# Patient Record
Sex: MALE | Race: BLACK OR AFRICAN AMERICAN | Employment: PART TIME | URBAN - METROPOLITAN AREA
[De-identification: names, ages, dates, MRNs, and addresses within clinical notes are randomized per-mention and may not be internally consistent; named-entity substitution may affect disease eponyms.]

---

## 2017-08-21 ENCOUNTER — ED HISTORICAL/CONVERTED ENCOUNTER (OUTPATIENT)
Dept: OTHER | Age: 37
End: 2017-08-21

## 2018-06-16 ENCOUNTER — ED HISTORICAL/CONVERTED ENCOUNTER (OUTPATIENT)
Dept: OTHER | Age: 38
End: 2018-06-16

## 2019-08-06 ENCOUNTER — ED HISTORICAL/CONVERTED ENCOUNTER (OUTPATIENT)
Dept: OTHER | Age: 39
End: 2019-08-06

## 2020-12-06 ENCOUNTER — HOSPITAL ENCOUNTER (INPATIENT)
Age: 40
LOS: 6 days | Discharge: HOME | DRG: 751 | End: 2020-12-12
Attending: EMERGENCY MEDICINE | Admitting: PSYCHIATRY & NEUROLOGY
Payer: MEDICAID

## 2020-12-06 DIAGNOSIS — F32.A DEPRESSION, UNSPECIFIED DEPRESSION TYPE: Primary | ICD-10-CM

## 2020-12-06 DIAGNOSIS — R45.851 SUICIDAL IDEATION: ICD-10-CM

## 2020-12-06 PROBLEM — F32.9 MDD (MAJOR DEPRESSIVE DISORDER): Status: ACTIVE | Noted: 2020-12-06

## 2020-12-06 LAB
AMPHET UR QL SCN: NEGATIVE
ANION GAP SERPL CALC-SCNC: 5 MMOL/L (ref 5–15)
BARBITURATES UR QL SCN: NEGATIVE
BASOPHILS # BLD: 0 K/UL (ref 0–0.1)
BASOPHILS NFR BLD: 0 % (ref 0–1)
BENZODIAZ UR QL: NEGATIVE
BUN SERPL-MCNC: 13 MG/DL (ref 6–20)
BUN/CREAT SERPL: 11 (ref 12–20)
CA-I BLD-MCNC: 9.5 MG/DL (ref 8.5–10.1)
CANNABINOIDS UR QL SCN: NEGATIVE
CHLORIDE SERPL-SCNC: 100 MMOL/L (ref 97–108)
CO2 SERPL-SCNC: 32 MMOL/L (ref 21–32)
COCAINE UR QL SCN: POSITIVE
CREAT SERPL-MCNC: 1.16 MG/DL (ref 0.7–1.3)
DIFFERENTIAL METHOD BLD: ABNORMAL
DRUG SCRN COMMENT,DRGCM: ABNORMAL
EOSINOPHIL # BLD: 0.1 K/UL (ref 0–0.4)
EOSINOPHIL NFR BLD: 1 % (ref 0–7)
ERYTHROCYTE [DISTWIDTH] IN BLOOD BY AUTOMATED COUNT: 13.4 % (ref 11.5–14.5)
ETHANOL SERPL-MCNC: <4 MG/DL
GLUCOSE SERPL-MCNC: 99 MG/DL (ref 65–100)
HCT VFR BLD AUTO: 45.8 % (ref 36.6–50.3)
HGB BLD-MCNC: 15.9 G/DL (ref 12.1–17)
IMM GRANULOCYTES # BLD AUTO: 0 K/UL (ref 0–0.04)
IMM GRANULOCYTES NFR BLD AUTO: 0 % (ref 0–0.5)
LYMPHOCYTES # BLD: 2.8 K/UL (ref 0.8–3.5)
LYMPHOCYTES NFR BLD: 20 % (ref 12–49)
MCH RBC QN AUTO: 29.5 PG (ref 26–34)
MCHC RBC AUTO-ENTMCNC: 34.7 G/DL (ref 30–36.5)
MCV RBC AUTO: 85 FL (ref 80–99)
METHADONE UR QL: NEGATIVE
MONOCYTES # BLD: 1.1 K/UL (ref 0–1)
MONOCYTES NFR BLD: 8 % (ref 5–13)
NEUTS SEG # BLD: 9.7 K/UL (ref 1.8–8)
NEUTS SEG NFR BLD: 71 % (ref 32–75)
OPIATES UR QL: NEGATIVE
PCP UR QL: NEGATIVE
PLATELET # BLD AUTO: 319 K/UL (ref 150–400)
PMV BLD AUTO: 10 FL (ref 8.9–12.9)
POTASSIUM SERPL-SCNC: 3.2 MMOL/L (ref 3.5–5.1)
RBC # BLD AUTO: 5.39 M/UL (ref 4.1–5.7)
SARS-COV-2, COV2: NORMAL
SARS-COV-2, COV2: NOT DETECTED
SODIUM SERPL-SCNC: 137 MMOL/L (ref 136–145)
SPECIMEN SOURCE: NORMAL
WBC # BLD AUTO: 13.7 K/UL (ref 4.1–11.1)

## 2020-12-06 PROCEDURE — 65220000003 HC RM SEMIPRIVATE PSYCH

## 2020-12-06 PROCEDURE — 65270000029 HC RM PRIVATE

## 2020-12-06 PROCEDURE — 87635 SARS-COV-2 COVID-19 AMP PRB: CPT

## 2020-12-06 PROCEDURE — 85025 COMPLETE CBC W/AUTO DIFF WBC: CPT

## 2020-12-06 PROCEDURE — 36415 COLL VENOUS BLD VENIPUNCTURE: CPT

## 2020-12-06 PROCEDURE — 99283 EMERGENCY DEPT VISIT LOW MDM: CPT

## 2020-12-06 PROCEDURE — 80307 DRUG TEST PRSMV CHEM ANLYZR: CPT

## 2020-12-06 PROCEDURE — 80048 BASIC METABOLIC PNL TOTAL CA: CPT

## 2020-12-06 PROCEDURE — 74011250637 HC RX REV CODE- 250/637: Performed by: PSYCHIATRY & NEUROLOGY

## 2020-12-06 RX ORDER — LANOLIN ALCOHOL/MO/W.PET/CERES
3 CREAM (GRAM) TOPICAL
Status: DISCONTINUED | OUTPATIENT
Start: 2020-12-06 | End: 2020-12-12 | Stop reason: HOSPADM

## 2020-12-06 RX ORDER — CHLORDIAZEPOXIDE HYDROCHLORIDE 10 MG/1
10 CAPSULE, GELATIN COATED ORAL
Status: DISCONTINUED | OUTPATIENT
Start: 2020-12-06 | End: 2020-12-12 | Stop reason: HOSPADM

## 2020-12-06 RX ORDER — BUPROPION HYDROCHLORIDE 100 MG/1
100 TABLET, EXTENDED RELEASE ORAL DAILY
Status: DISCONTINUED | OUTPATIENT
Start: 2020-12-06 | End: 2020-12-09

## 2020-12-06 RX ADMIN — MELATONIN TAB 3 MG 3 MG: 3 TAB at 22:21

## 2020-12-06 NOTE — BSMART NOTE
BH Intake      Pt assessed face to face in ED #25. Pt presents to ED by private vehicle for SI. Pt presents as polite, cooperative. Pt was A&Ox4. Pt's speech was unremarkable, w/ an appropriate affect. Pt's thought process was logical, focused. Pt endorses SI, denies HI. Pt presents w/ fair insight and judgment. Pt reports he's not doing so good. Pt reported his ex girlfriend passed away last week and another girlfriend had passed away a few months ago. Pt reports \"struggling w/ life in general\". And wants to get his \"mind back right\". Pt endorses daily SI w/ a plan to hang himself saying \"theres plenty of trees around\". Pt denies any hx of past suicidal attempts. Pt rates his depression 10/10. Pt denies HI. Pt reports loss of interest in things and has been withdrawing from others. Pt denies AVH. Pt said his sleep is \"so so\" but only gets a few hours a night. Pt reports losing about 30 pounds in the last month. Pt reports using cocaine daily, about $400 worth yesterday. Pt endorses daily alcohol use, at least a 6 pack, last use yesterday. Pt reports racing thoughts \"all the time\" and stated his anxiety is \"through the roof\". Pt reports past hx of depression and alcohol abuse. Pt reports last IP [address] admission was a couple years ago. Pt denies current OP [address] care. Pt reports his sister is his support. Pt reports he has housing to return to. Pt currently works as a  at his [employer]. Pt is vol for tx. Writer consulted w/  who recommends IP [address] admission. Pt to be accepted to  pending medical clearance/ bed availability. ED notified. Covid test needed.

## 2020-12-06 NOTE — ED NOTES
Assumed care of pt at this time from [facility]. All essential information handed off. Pt stable at this time. Laying on stretcher with eyes open. Resp even and unlabored. Skin warm and dry. A&O x3. Admission to behavioral health pending covid. Pt room cleaned, provided oral hydration, offered oral hygiene. Pt declined. Will continue to monitor.

## 2020-12-06 NOTE — H&P
INITIAL PSYCHIATRIC EVALUATION            IDENTIFICATION:    Patient Name  Leslye Tao   Date of Birth 1980   [facility] [id number]   Medical Record Number  [id number]      Age  41 y.o. PCP None   Admit date:  12/6/2020    Room Number  ER25/25  @ [facility]   Date of Service  12/6/2020            HISTORY         REASON FOR HOSPITALIZATION:  CC: Depression, suicidal ideation, recent loss of his girlfriend, substance abuse   HISTORY OF PRESENT ILLNESS:    The patient, Leslye Tao, is a 41 y.o. BLACK OR  male seen this morning in the emergency department for psychiatric assessment. Patient presented feeling increasingly depressed suicidal with a plan to hang himself stating \"there are plenty of trees around\". Patient reports he has not been feeling well and recently lost his ex-girlfriend who passed away a week ago and another friend passed a few months ago. He expresses he has been struggling with life in general.  He has been increasingly isolating withdrawing from others. He expresses poor sleep poor appetite. He reports losing about 30 pounds in the last month. He is also been using cocaine around $400 on and off. He also endorses regular use of alcohol. Denies any command hallucinations denies any paranoia or persecutory delusions    Past psychiatric history-no recent psychiatric treatment noted or reported denies any past history of suicidal attempts. Trauma abuse history-did not share    Review of system no nausea vomiting no chest pain or shortness of breath voiced or reported    Mental status examination patient is alert oriented x3 presents dysphoric limited eye contact, cooperative. Patient reports feeling depressed, hopeless helpless with suicidal ideations with the plan as stated above. Patient denies any active command hallucinations.   No evidence of any active psychosis noted on examination Insight judgment coping remains poor   ALLERGIES: No Known Allergies   MEDICATIONS PRIOR TO ADMISSION:   (Not in a hospital admission)     PAST MEDICAL HISTORY:   No past medical history on file. No past surgical history on file. SOCIAL HISTORY:    Social History     Socioeconomic History    Marital status: SINGLE     Spouse name: Not on file    Number of children: Not on file    Years of education: Not on file    Highest education level: Not on file   Occupational History    Not on file   Social Needs    Financial resource strain: Not on file    Food insecurity     Worry: Not on file     Inability: Not on file    Transportation needs     Medical: Not on file     Non-medical: Not on file   Tobacco Use    Smoking status: Not on file   Substance and Sexual Activity    Alcohol use: Not on file    Drug use: Not on file    Sexual activity: Not on file   Lifestyle    Physical activity     Days per week: Not on file     Minutes per session: Not on file    Stress: Not on file   Relationships    Social connections     Talks on phone: Not on file     Gets together: Not on file     Attends Congregational service: Not on file     Active member of club or organization: Not on file     Attends meetings of clubs or organizations: Not on file     Relationship status: Not on file    Intimate partner violence     Fear of current or ex partner: Not on file     Emotionally abused: Not on file     Physically abused: Not on file     Forced sexual activity: Not on file   Other Topics Concern    Not on file   Social History Narrative    Not on file      FAMILY HISTORY: History reviewed. No pertinent family history. No family history on file.     REVIEW OF SYSTEMS:   Negative        VITALS:     Patient Vitals for the past 24 hrs:   Temp Pulse Resp BP SpO2   12/06/20 0745 97.4 °F (36.3 °C) 78 16 (!) 142/93 100 %     Wt Readings from Last 3 Encounters:   12/06/20 68 kg (150 lb)     Temp Readings from Last 3 Encounters:   12/06/20 97.4 °F (36.3 °C)     BP Readings from Last 3 Encounters:   12/06/20 (!) 142/93     Pulse Readings from Last 3 Encounters:   12/06/20 78            DATA     LABORATORY DATA:  Labs Reviewed   METABOLIC PANEL, BASIC - Abnormal; Notable for the following components:       Result Value    Potassium 3.2 (*)     BUN/Creatinine ratio 11 (*)     All other components within normal limits   CBC WITH AUTOMATED DIFF - Abnormal; Notable for the following components:    WBC 13.7 (*)     ABS. NEUTROPHILS 9.7 (*)     ABS. MONOCYTES 1.1 (*)     All other components within normal limits   SARS-COV-2, PCR   ETHYL ALCOHOL   SARS-COV-2   DRUG SCREEN, URINE     Admission on 12/06/2020   Component Date Value Ref Range Status    Sodium 12/06/2020 137  136 - 145 mmol/L Final    Potassium 12/06/2020 3.2* 3.5 - 5.1 mmol/L Final    Chloride 12/06/2020 100  97 - 108 mmol/L Final    CO2 12/06/2020 32  21 - 32 mmol/L Final    Anion gap 12/06/2020 5  5 - 15 mmol/L Final    Glucose 12/06/2020 99  65 - 100 mg/dL Final    BUN 12/06/2020 13  6 - 20 mg/dL Final    Creatinine 12/06/2020 1.16  0.70 - 1.30 mg/dL Final    BUN/Creatinine ratio 12/06/2020 11* 12 - 20   Final    GFR est AA 12/06/2020 >60  >60 ml/min/1.73m2 Final    GFR est non-AA 12/06/2020 >60  >60 ml/min/1.73m2 Final    Calcium 12/06/2020 9.5  8.5 - 10.1 mg/dL Final    ALCOHOL(ETHYL),SERUM 12/06/2020 <4  <10 mg/dL Final    WBC 12/06/2020 13.7* 4.1 - 11.1 K/uL Final    RBC 12/06/2020 5.39  4. 10 - 5.70 M/uL Final    HGB 12/06/2020 15.9  12.1 - 17.0 g/dL Final    HCT 12/06/2020 45.8  36.6 - 50.3 % Final    MCV 12/06/2020 85.0  80.0 - 99.0 FL Final    MCH 12/06/2020 29.5  26.0 - 34.0 PG Final    MCHC 12/06/2020 34.7  30.0 - 36.5 g/dL Final    RDW 12/06/2020 13.4  11.5 - 14.5 % Final    PLATELET 67/90/8113 349  150 - 400 K/uL Final    MPV 12/06/2020 10.0  8.9 - 12.9 FL Final    NEUTROPHILS 12/06/2020 71  32 - 75 % Final    LYMPHOCYTES 12/06/2020 20  12 - 49 % Final    MONOCYTES 12/06/2020 8  5 - 13 % Final    EOSINOPHILS 12/06/2020 1  0 - 7 % Final    BASOPHILS 12/06/2020 0  0 - 1 % Final    IMMATURE GRANULOCYTES 12/06/2020 0  0.0 - 0.5 % Final    ABS. NEUTROPHILS 12/06/2020 9.7* 1.8 - 8.0 K/UL Final    ABS. LYMPHOCYTES 12/06/2020 2.8  0.8 - 3.5 K/UL Final    ABS. MONOCYTES 12/06/2020 1.1* 0.0 - 1.0 K/UL Final    ABS. EOSINOPHILS 12/06/2020 0.1  0.0 - 0.4 K/UL Final    ABS. BASOPHILS 12/06/2020 0.0  0.0 - 0.1 K/UL Final    ABS. IMM. GRANS. 12/06/2020 0.0  0.00 - 0.04 K/UL Final    DF 12/06/2020 AUTOMATED    Final    SARS-CoV-2 12/06/2020 Please find results under separate order    Final        RADIOLOGY REPORTS:  No results found for this or any previous visit. No results found. MEDICATIONS       ALL MEDICATIONS  No current facility-administered medications for this encounter. No current outpatient medications on file. SCHEDULED MEDICATIONS  No current facility-administered medications for this encounter. ASSESSMENT & PLAN        The patient, Claudean Shire, is a 41 y.o.  male who presents at this time for treatment of the following diagnoses:  Diagnosis-  Major depressive disorder, moderate recurrent  Cocaine abuse  Alcohol abuse    Admit to behavioral health floor once culture results are available and medically stable    Placed on close observation, for safety  Patient to engage in individual therapy, group therapy support group, psychoeducational group, safety planning. Patient continue to address stressors that led to his hospitalization. Length of stay is 5 to 7 days. Strengths-patient able to express self, able to communicate, average intelligence  Weakness-poor coping, comorbid substance abuse, limited primary support. Discharge Criteria  Patient is no longer actively suicidal or homicidal and has no command hallucinations.   Patient is able to address current symptoms of depression, suicidal ideation and is able to present with healthy ways to cope with current stressors. Current Facility-Administered Medications:     melatonin tablet 3 mg, 3 mg, Oral, QHS, Bijal Park MD    buPROPion SR [facility] SR) tablet 100 mg, 100 mg, Oral, DAILY, Bijal Park MD    chlordiazePOXIDE (LIBRIUM) capsule 10 mg, 10 mg, Oral, TID PRN, Epifanio Escobar MD  No current outpatient medications on file. The risks and benefits of the proposed medication. The patient was given the opportunity to ask questions. Informed consent given to the use of the above medications. Will continue to adjust psychiatric and non-psychiatric medications    I have reviewed admission labs and medical tests in the EHR    have reviewed old psychiatric and medical records available in the EHR    Gather additional collateral information from to further elucidate the nature of patient's psychopathology and baselline level of psychiatric functioning.                  SIGNED:    Trev Flores MD  12/6/2020

## 2020-12-06 NOTE — ED PROVIDER NOTES
EMERGENCY DEPARTMENT HISTORY AND PHYSICAL EXAM      Date: 12/6/2020  Patient Name: Portia Frazier    History of Presenting Illness     Chief Complaint   Patient presents with   [address]       History Provided By: Patient    HPI: Portia Frazier, 41 y.o. male presents to the ED with cc of   Chief Complaint   Patient presents with   [address] Problem   Patient with history of depression not taking any medication for last 2 years presenting with increased depressive symptoms and suicidal ideation no specific plan at this time also admits of drinking smoking and occasionally using drugs, denies any fever denies any nausea vomiting diarrhea or any exposure to COVID-19      There are no other complaints, changes, or physical findings at this time. PCP: None    No current facility-administered medications on file prior to encounter. No current outpatient medications on file prior to encounter. Past History     Past Medical History:  No past medical history on file. Past Surgical History:  No past surgical history on file. Family History:  No family history on file. Social History:  Social History     Tobacco Use    Smoking status: Not on file   Substance Use Topics    Alcohol use: Not on file    Drug use: Not on file       Allergies:  No Known Allergies      Review of Systems   Review of Systems   Constitutional: Negative. HENT: Negative for congestion, facial swelling, rhinorrhea and sore throat. Eyes: Negative. Negative for photophobia and pain. Respiratory: Negative for cough, shortness of breath and wheezing. Cardiovascular: Negative. Negative for chest pain. Gastrointestinal: Negative for abdominal distention and abdominal pain. Genitourinary: Negative. Musculoskeletal: Negative. Allergic/Immunologic: Negative for immunocompromised state. Neurological: Negative. Negative for syncope and weakness. Hematological: Negative.     Psychiatric/Behavioral: Positive for suicidal ideas. Negative for agitation, behavioral problems and confusion. The patient is nervous/anxious. Physical Exam   Physical Exam  Vitals signs and nursing note reviewed. Constitutional:       Appearance: Normal appearance. HENT:      Head: Normocephalic and atraumatic. Mouth/Throat:      Pharynx: Oropharynx is clear. Eyes:      Extraocular Movements: Extraocular movements intact. Pupils: Pupils are equal, round, and reactive to light. Neck:      Musculoskeletal: Normal range of motion and neck supple. Cardiovascular:      Rate and Rhythm: Normal rate and regular rhythm. Pulses: Normal pulses. Heart sounds: Normal heart sounds. Pulmonary:      Breath sounds: Normal breath sounds. Chest:      Breasts:         Right: Normal.         Left: No tenderness. Abdominal:      General: Abdomen is flat. Bowel sounds are normal.      Palpations: Abdomen is soft. Musculoskeletal: Normal range of motion. Skin:     General: Skin is warm and dry. Neurological:      General: No focal deficit present. Mental Status: He is alert and oriented to person, place, and time. Psychiatric:         Mood and Affect: Mood is anxious and depressed. Affect is not angry. Behavior: Behavior normal. Behavior is not agitated or aggressive. Thought Content: Thought content is not delusional. Thought content includes suicidal ideation. Thought content includes suicidal (Had different plans in the past but no specific plan at this time) plan. Thought content does not include homicidal plan. Cognition and Memory: Cognition is not impaired.          Diagnostic Study Results     Labs -     Recent Results (from the past 12 hour(s))   METABOLIC PANEL, BASIC    Collection Time: 12/06/20  7:45 AM   Result Value Ref Range    Sodium 137 136 - 145 mmol/L    Potassium 3.2 (L) 3.5 - 5.1 mmol/L    Chloride 100 97 - 108 mmol/L    CO2 32 21 - 32 mmol/L    Anion gap 5 5 - 15 mmol/L    Glucose 99 65 - 100 mg/dL    BUN 13 6 - 20 mg/dL    Creatinine 1.16 0.70 - 1.30 mg/dL    BUN/Creatinine ratio 11 (L) 12 - 20      GFR est AA >60 >60 ml/min/1.73m2    GFR est non-AA >60 >60 ml/min/1.73m2    Calcium 9.5 8.5 - 10.1 mg/dL   ETHYL ALCOHOL    Collection Time: 12/06/20  7:45 AM   Result Value Ref Range    ALCOHOL(ETHYL),SERUM <4 <10 mg/dL   CBC WITH AUTOMATED DIFF    Collection Time: 12/06/20  7:45 AM   Result Value Ref Range    WBC 13.7 (H) 4.1 - 11.1 K/uL    RBC 5.39 4. 10 - 5.70 M/uL    HGB 15.9 12.1 - 17.0 g/dL    HCT 45.8 36.6 - 50.3 %    MCV 85.0 80.0 - 99.0 FL    MCH 29.5 26.0 - 34.0 PG    MCHC 34.7 30.0 - 36.5 g/dL    RDW 13.4 11.5 - 14.5 %    PLATELET 160 140 - 829 K/uL    MPV 10.0 8.9 - 12.9 FL    NEUTROPHILS 71 32 - 75 %    LYMPHOCYTES 20 12 - 49 %    MONOCYTES 8 5 - 13 %    EOSINOPHILS 1 0 - 7 %    BASOPHILS 0 0 - 1 %    IMMATURE GRANULOCYTES 0 0.0 - 0.5 %    ABS. NEUTROPHILS 9.7 (H) 1.8 - 8.0 K/UL    ABS. LYMPHOCYTES 2.8 0.8 - 3.5 K/UL    ABS. MONOCYTES 1.1 (H) 0.0 - 1.0 K/UL    ABS. EOSINOPHILS 0.1 0.0 - 0.4 K/UL    ABS. BASOPHILS 0.0 0.0 - 0.1 K/UL    ABS. IMM. GRANS. 0.0 0.00 - 0.04 K/UL    DF AUTOMATED         Labs reviewed by me    Radiologic Studies -   No orders to display     CT Results  (Last 48 hours)    None        CXR Results  (Last 48 hours)    None            Medical Decision Making     I am the first provider for this patient. I reviewed the vital signs, available nursing notes, past medical history, past surgical history, family history and social history. RADIOLOGY report and LABS reviewed by me    Vital Signs-Reviewed the patient's vital signs. Patient Vitals for the past 12 hrs:   Temp Pulse Resp BP SpO2   12/06/20 0745 97.4 °F (36.3 °C) 78 16 (!) 142/93 100 %       EKG interpretation: (Preliminary)      Records Reviewed: Nurse's note.       Provider Notes (Medical Decision Making):    Patient presents with DIFF DX :         ED Course:   Initial assessment performed. The patients presenting problems have been discussed, and they are in agreement with the care plan formulated and outlined with them. I have encouraged them to ask questions as they arise throughout their visit. TREATMENT RESPONSE -Stable          Eric Salter MD      Disposition:  Admitted   Diagnostic tests were reviewed and questions answered. Diagnosis, care plan and treatment options were discussed. The patient understand instructions and will follow up as directed. Condition stable    Admitting Provider:  No admitting provider for patient encounter. Consulting Provider:  No ref. provider found       DISCHARGE PLAN:  1. There are no discharge medications for this patient. 2.   Follow-up Information    None       3. Return to ED if worse     Diagnosis     Clinical Impression:     ICD-10-CM ICD-9-CM    1. Depression, unspecified depression type  F32.9 311    2. Suicidal ideation  R45. 1 V62.84         Attestations:    Eric Salter MD    Please note that this dictation was completed with [employer], the computer voice recognition software. Quite often unanticipated grammatical, syntax, homophones, and other interpretive errors are inadvertently transcribed by the computer software. Please disregard these errors. Please excuse any errors that have escaped final proofreading. Thank you.

## 2020-12-07 PROCEDURE — 74011250637 HC RX REV CODE- 250/637: Performed by: PSYCHIATRY & NEUROLOGY

## 2020-12-07 PROCEDURE — 65220000003 HC RM SEMIPRIVATE PSYCH

## 2020-12-07 RX ORDER — PANTOPRAZOLE SODIUM 40 MG/1
40 GRANULE, DELAYED RELEASE ORAL
Status: DISCONTINUED | OUTPATIENT
Start: 2020-12-07 | End: 2020-12-08 | Stop reason: SDUPTHER

## 2020-12-07 RX ADMIN — BUPROPION HYDROCHLORIDE 100 MG: 100 TABLET, FILM COATED, EXTENDED RELEASE ORAL at 08:32

## 2020-12-07 RX ADMIN — MELATONIN TAB 3 MG 3 MG: 3 TAB at 21:21

## 2020-12-07 RX ADMIN — CHLORDIAZEPOXIDE HYDROCHLORIDE 10 MG: 10 CAPSULE ORAL at 21:26

## 2020-12-07 RX ADMIN — PANTOPRAZOLE SODIUM 40 MG: 40 GRANULE, DELAYED RELEASE ORAL at 17:46

## 2020-12-07 RX ADMIN — CHLORDIAZEPOXIDE HYDROCHLORIDE 10 MG: 10 CAPSULE ORAL at 08:32

## 2020-12-07 NOTE — ED NOTES
TRANSFER - OUT REPORT:    Verbal report given to Dunia Carrasquillo (name) on Lodema Brighter  being transferred to  (unit) for routine progression of care       Report consisted of patients Situation, Background, Assessment and   Recommendations(SBAR). Information from the following report(s) SBAR, ED Summary, [facility] and Recent Results was reviewed with the receiving nurse. Lines:       Opportunity for questions and clarification was provided.       Patient transported with:   Registered Nurse

## 2020-12-07 NOTE — ED NOTES
Pt alert, calm and pleasant in bed. Ate 100% of breakfast. Denies any pain/discomfort. Call bell within reach.  Will continue to observe for any changes

## 2020-12-07 NOTE — BH NOTES
Ms. Axel Mcgowan seen in the emergency department awaiting bed after covid results negative. Patient presents with depression suicidal ideations with a plan. He denies any auditory visual hallucinations. He also expresses recent losses and struggling with life in general.  He has been isolating with poor appetite and sleep. He has significant weight loss as per the patient. Mental status examination-patient is alert oriented x3 cooperative. Depressed disheveled suicidal ideations with a plan no evidence of any active psychosis Insight judgment coping is poor    Assessment plan  Wellbutrin  mg p.o. daily melatonin 3 mg at bedtime   We will start Protonix 40 mg p.o. daily for his gastritis. He states he takes Prilosec on the outside  Provided support psychoeducation  Awaiting bed placement on 2 S. behavioral health floor.   Lesion close observation  Family meeting group therapy

## 2020-12-08 PROCEDURE — 74011250637 HC RX REV CODE- 250/637: Performed by: PSYCHIATRY & NEUROLOGY

## 2020-12-08 PROCEDURE — 74011250637 HC RX REV CODE- 250/637: Performed by: INTERNAL MEDICINE

## 2020-12-08 PROCEDURE — 65220000003 HC RM SEMIPRIVATE PSYCH

## 2020-12-08 RX ORDER — PANTOPRAZOLE SODIUM 40 MG/1
40 TABLET, DELAYED RELEASE ORAL
Status: DISCONTINUED | OUTPATIENT
Start: 2020-12-08 | End: 2020-12-12 | Stop reason: HOSPADM

## 2020-12-08 RX ORDER — IBUPROFEN 200 MG
1 TABLET ORAL DAILY
Status: DISCONTINUED | OUTPATIENT
Start: 2020-12-08 | End: 2020-12-12 | Stop reason: HOSPADM

## 2020-12-08 RX ORDER — POTASSIUM CHLORIDE 750 MG/1
40 TABLET, FILM COATED, EXTENDED RELEASE ORAL
Status: COMPLETED | OUTPATIENT
Start: 2020-12-08 | End: 2020-12-08

## 2020-12-08 RX ADMIN — MELATONIN TAB 3 MG 3 MG: 3 TAB at 21:30

## 2020-12-08 RX ADMIN — CHLORDIAZEPOXIDE HYDROCHLORIDE 10 MG: 10 CAPSULE ORAL at 21:38

## 2020-12-08 RX ADMIN — POTASSIUM CHLORIDE 40 MEQ: 750 TABLET, FILM COATED, EXTENDED RELEASE ORAL at 16:31

## 2020-12-08 RX ADMIN — PANTOPRAZOLE SODIUM 40 MG: 40 TABLET, DELAYED RELEASE ORAL at 11:07

## 2020-12-08 RX ADMIN — BUPROPION HYDROCHLORIDE 100 MG: 100 TABLET, FILM COATED, EXTENDED RELEASE ORAL at 08:35

## 2020-12-08 NOTE — BH NOTES
54 Pt. received from ED alert and o x4,pleasant,anxious,cooperative appropriate and with brighter affect. Pt. is social with peers. 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 Pt.also stated that he has a lot of family problem,financial and relationship . Per pt. ex gf passed a week ago and other gf  5 months ago. Pt. had a weight loss of 30 lbs in 30 days. Pt. denies SI/HI/AVH. On admission he denies sucidal. Pt. smokes 1 1/2 per day,drink beer 6-20/day and used cocaine on 12/ morning and evening\" I relapse\"No past medical history on file. Pt. stated that he has a h/o irrigular heart beat and surgery to left calf on . Legal: incarcerated x 29 months for using unauthorized vehicle. Pt. Is a . Pt. was oriented to unit and policies. Safety checks continue q 15 minutes. Scheduled meds given as prescribed. Eating and drinking well. Dr. Fabrice Luis was informed by other staff about admission on front and back unit.

## 2020-12-08 NOTE — CONSULTS
Consult Date: 12/8/2020    Chief Complaint:   Chief Complaint   Patient presents with   [address]   No complaints   HPI: HPI patient is a 41years old -American man who has been admitted here for psychiatry evaluation and treatment. He denies any history of cough fever or chills no history of chest pain or shortness of breath no history of nausea vomiting diarrhea abdominal pain or black stool no history of increased frequency of micturition or painful micturition no history of joint pain or joint swelling no history of headache or dizziness no history of loss of consciousness or seizures. No history of change in vision. According to patient occasionally he has pressure-like feeling in his chest which does not have any relation to exertion or there is no shortness of breath associated with it no radiation no diaphoresis no palpitation. ROS:ROS as above  Constitutional: Negative  HEENT: Negative  CVS: Atypical chest pain  RS: Negative  GI: Negative  : Negative  Musculoskeletal: Negative  Immunology: Records are not available  Neurology: Negative  Endocrine: Negative  Haem-Onc: Negative  Skin: Negative  Psychiatry: Depression  Allergies  No Known Allergies  FAMILY HISTORY:  No family history on file.   SOCIAL HISTORY:  Social History     Socioeconomic History    Marital status: SINGLE     Spouse name: Not on file    Number of children: Not on file    Years of education: Not on file    Highest education level: Not on file   Occupational History    Not on file   Social Needs    Financial resource strain: Not on file    Food insecurity     Worry: Not on file     Inability: Not on file    Transportation needs     Medical: Not on file     Non-medical: Not on file   Tobacco Use    Smoking status: Not on file   Substance and Sexual Activity    Alcohol use: Not on file    Drug use: Not on file    Sexual activity: Not on file   Lifestyle    Physical activity     Days per week: Not on file     Minutes per session: Not on file    Stress: Not on file   Relationships    Social connections     Talks on phone: Not on file     Gets together: Not on file     Attends Rastafarian service: Not on file     Active member of club or organization: Not on file     Attends meetings of clubs or organizations: Not on file     Relationship status: Not on file    Intimate partner violence     Fear of current or ex partner: Not on file     Emotionally abused: Not on file     Physically abused: Not on file     Forced sexual activity: Not on file   Other Topics Concern    Not on file   Social History Narrative    Not on file     Alcohol history: Yes, Daily on weekdays, Daily on weekends of Daily  Smoking history: 1 and half pack per day  Illicit drug history: Was abusing cocaine but according to him last 4 years he has not been using any drugs    Living arrangement: patient lives alone. Ambulates: Independently     SURGICAL HISTORY:  No past surgical history on file. PMH:  No past medical history on file.   Home Medications   Prior to Admission medications    Not on File     Vitals:  Patient Vitals for the past 24 hrs:   Temp Pulse Resp BP SpO2   12/08/20 0755 97.7 °F (36.5 °C) 70 18 104/72    12/07/20 1954 97.8 °F (36.6 °C) 68 18 114/70 99 %   12/07/20 1435 98.3 °F (36.8 °C) 79 16 131/85         General Examination: Physical Exam patient is a 43-year-old -American man well-built well-nourished not in any acute distress alert awake oriented x3  HEENT: Normocephalic atraumatic skull pupils are bilaterally equally reactive to light sclera nonicteric conjunctiva pink no edema of the eyelids no yellow nasal discharge tongue is pink no ulcer positive gag reflex no pharyngeal inflammation extraocular movements and intact  Neck: Supple full range of motion no JVD no general lymphadenopathy no thyromegaly no carotid bruit  Chest: Expands well no localized swelling or tenderness  RS: Clear breath sounds no rhonchi no rales  CVS: S1-S2 audible regular no murmur gallop or pericardial rub  Abdomen: Soft bowel sounds are positive no organomegaly no tenderness  Extremeties: No edema no clubbing no cyanosis peripheral pulses 2+  CNS: Grossly unremarkable cranial nerves I to XII are individually checked and they are intact  Muscle power 5/5  Reflexes 2+  Plantars downgoing  No sensory abnormality or deficit  Romberg sign is negative  Finger-to-nose test is negative          LABS: WBC count is 13.7  Potassium is 3.2  Given the patient's age he is not abusing any drugs since last 4 years his cocaine is positive in urine    CXR Results  (Last 48 hours)    None          No results found for this or any previous visit (from the past 12 hour(s)). No orders to display        ASSESSMENT/PLAN: Leukocytosis  Hypokalemia  Atypical chest pain  Substance abuse  Depression  As patient does not have any signs and symptoms of infection I will just repeat the CBC in the morning.   We will give potassium supplement and check potassium level in the morning  Patient is medically stable for psychiatry evaluation and treatment      1:09 PM, 12/08/20  Joselin Diez MD

## 2020-12-08 NOTE — BH NOTES
FIREARM ASSESSMENT     Patient denies having a firearm in the home however he stated that he hunts so he does have access to guns. Patient also stated that his father has a gun in the home however it is locked in a safe. Patient was informed on the importance of removing the guns from the home.

## 2020-12-08 NOTE — BH NOTES
OPAL     Patient has been using cocaine for the past 20 years. Patient stated that in the past few weeks he has used $300 of cocaine daily. He reports periods of sobriety, the longest being 8-10 years. The patient stated that his cocaine use fluctuates frequently. Patient also reports using alcohol. Patient began using alcohol at age 11 and has had on and off periods of sobriety ever since. Patient states his longest period of sobriety was 4 years.

## 2020-12-08 NOTE — BH NOTES
[facility] Recreational Therapy Assessment    Orientation:  Person, Place, Date and Situation    Reason for Admission:  Pt reported \"he has been off his medication for over a year and has been feeling depressed, anxious, having racing thoughts and suicidal thoughts\".  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\"      Medical Precautions / Conditions:  Cardiac History per patient    Impairments:     Vision:  Wears Glasses No      Wears Contacts No      Are they with Patient n/a     Hearing Aids No     Utilization of Ambulatory Devices:  None    Health Problems Preventing Participation in Activities:  No  How:  Pt reported \"he just feel tired a lot\"    Leisure Interest Checklist:  Bowling, Cooking, Facebook, Fishing, Listening to Music, Sports and Visiting with Others    Does patient participate in leisure activities:  Yes    Setting:  Alone and With Friends    Other Activities / Skills / Talents:  Pt reported \"he go to drag strip and [employer]\"    Do emotions interfere with leisure activities / lifestyles:  Sometimes    When engaging in leisure activities, do you forget worries:  Yes    Do you belong to a Ca[employer]ism:  No    Are you active in [place] activities:  No    Typical Day:  Pt reported \"he work everyday and when he get off he spend time seeing his girlfriend or hang out at [employer] strip\"    Strengths:  Pt reported \"he know God loves him\"    Limitations / Barriers:  Substance Abuse, Finances, Energy, Motivation, Depressed Mood, Anxiety, Sleep and Non-Compliance with Meds / Follow Up    Treatment Modalities:  Stress Management, Coping Skills, Symptom Recognition, Healthy Thinking, Mood Management and Leisure Skills    Patient Educational  Needs:  Skills to recognize and challenge problematic thinking, Identify positive coping strategies and skills to manage symptoms or moods, Leisure education and Recognition of symptoms and signs    Focus of Treatment:  Introduce positive outlets for self expression and Introduce and encourage the exploration of alternative coping skills    Summary:  Pt was cooperative during assessment. 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 Pt reported he has been off his medications for over a year and was not following up with any providers.  Pt reported he was drinking and using drugs to help him cope with depression

## 2020-12-08 NOTE — GROUP NOTE
IP  GROUP DOCUMENTATION INDIVIDUAL                                                                          Group Therapy Note    Date: 12/7/2020    Group Start Time: 1900  Group End Time: 1950  Group Topic: Recreational/Music Therapy    [facility] 2 BH NON ACUTE    Amy Spotted    IP [facility] GROUP DOCUMENTATION GROUP    Group Therapy Note    Attendees: 7  Introduce healthy leisure task skill as positive way to cope and manage mood. Attendance: Attended    Patient's Goal:  STG: Attends activities and groups        Interventions/techniques: Art integration and Supported    Follows Directions: Followed directions    Interactions: Interacted appropriately    Mental Status: Calm and Flat    Behavior/appearance: Attentive and Cooperative    Goals Achieved: Able to engage in interactions and Able to listen to others      Additional Notes: Attended group and actively participated. Was receptive to intervention. Accepted leisure activity packet. Was receptive to intervention. Able to select songs to listen to in group. Used leisure time constructively.      Buzz Swanson, CTRS

## 2020-12-08 NOTE — BH NOTES
Patient observed up on unit, on telephone and socializing with peers. Rates his anxiety 15/10 and depression 7-8/10. He states it is part because he was in the ER for 2 days and part related to alcohol and cocaine withdrawal.    Denies SI, HI, and A/V hallucinations. Verbally contracts for safety at this time. Patient lying in bed with eyes closed, respirations even and unlabored. No signs of pain or distress. Remains on q15min close observation for safety.
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
## 2020-12-08 NOTE — GROUP NOTE
SAIDA  GROUP DOCUMENTATION INDIVIDUAL                                                                          Group Therapy Note    Date: 12/8/2020    Group Start Time: 1100  Group End Time: 3251  Group Topic: Education Group - Inpatient    [facility] CARE MANAGEMENT    Tara Underwood    IP [address] [facility] GROUP DOCUMENTATION GROUP    Group Therapy Note  Patients participated in psycho education group therapy. Patients discussed triggers and feelings. Group concluded with guided mediation. Attendees: 6 out of 8         Attendance: Attended    Patient's Goal:  Patient working toward goal of attending group therapy. Interventions/techniques: Informed    Follows Directions: Followed directions    Interactions: Interacted appropriately    Mental Status: Congruent    Behavior/appearance: Attentive and Cooperative    Goals Achieved: Identified feelings and Identified triggers      Additional Notes:  Patient came to group however left in the beginning for an assessment.      Marky Lees

## 2020-12-09 LAB
ALBUMIN SERPL-MCNC: 3.6 G/DL (ref 3.5–5)
ALBUMIN/GLOB SERPL: 0.9 {RATIO} (ref 1.1–2.2)
ALP SERPL-CCNC: 67 U/L (ref 45–117)
ALT SERPL-CCNC: 19 U/L (ref 12–78)
ANION GAP SERPL CALC-SCNC: 3 MMOL/L (ref 5–15)
AST SERPL W P-5'-P-CCNC: 10 U/L (ref 15–37)
BASOPHILS # BLD: 0 K/UL (ref 0–0.1)
BASOPHILS NFR BLD: 0 % (ref 0–1)
BILIRUB SERPL-MCNC: 0.6 MG/DL (ref 0.2–1)
BUN SERPL-MCNC: 26 MG/DL (ref 6–20)
BUN/CREAT SERPL: 22 (ref 12–20)
CA-I BLD-MCNC: 8.9 MG/DL (ref 8.5–10.1)
CHLORIDE SERPL-SCNC: 105 MMOL/L (ref 97–108)
CO2 SERPL-SCNC: 30 MMOL/L (ref 21–32)
CREAT SERPL-MCNC: 1.18 MG/DL (ref 0.7–1.3)
DIFFERENTIAL METHOD BLD: NORMAL
EOSINOPHIL # BLD: 0.2 K/UL (ref 0–0.4)
EOSINOPHIL NFR BLD: 3 % (ref 0–7)
ERYTHROCYTE [DISTWIDTH] IN BLOOD BY AUTOMATED COUNT: 13.1 % (ref 11.5–14.5)
GLOBULIN SER CALC-MCNC: 4 G/DL (ref 2–4)
GLUCOSE SERPL-MCNC: 86 MG/DL (ref 65–100)
HCT VFR BLD AUTO: 44.2 % (ref 36.6–50.3)
HGB BLD-MCNC: 15 G/DL (ref 12.1–17)
IMM GRANULOCYTES # BLD AUTO: 0 K/UL (ref 0–0.04)
IMM GRANULOCYTES NFR BLD AUTO: 0 % (ref 0–0.5)
LYMPHOCYTES # BLD: 1.9 K/UL (ref 0.8–3.5)
LYMPHOCYTES NFR BLD: 25 % (ref 12–49)
MCH RBC QN AUTO: 28.6 PG (ref 26–34)
MCHC RBC AUTO-ENTMCNC: 33.9 G/DL (ref 30–36.5)
MCV RBC AUTO: 84.4 FL (ref 80–99)
MONOCYTES # BLD: 0.6 K/UL (ref 0–1)
MONOCYTES NFR BLD: 8 % (ref 5–13)
NEUTS SEG # BLD: 4.8 K/UL (ref 1.8–8)
NEUTS SEG NFR BLD: 64 % (ref 32–75)
PLATELET # BLD AUTO: 315 K/UL (ref 150–400)
PMV BLD AUTO: 10.1 FL (ref 8.9–12.9)
POTASSIUM SERPL-SCNC: 4.1 MMOL/L (ref 3.5–5.1)
PROT SERPL-MCNC: 7.6 G/DL (ref 6.4–8.2)
RBC # BLD AUTO: 5.24 M/UL (ref 4.1–5.7)
SODIUM SERPL-SCNC: 138 MMOL/L (ref 136–145)
WBC # BLD AUTO: 7.5 K/UL (ref 4.1–11.1)

## 2020-12-09 PROCEDURE — 74011250637 HC RX REV CODE- 250/637: Performed by: PSYCHIATRY & NEUROLOGY

## 2020-12-09 PROCEDURE — 36415 COLL VENOUS BLD VENIPUNCTURE: CPT

## 2020-12-09 PROCEDURE — 80053 COMPREHEN METABOLIC PANEL: CPT

## 2020-12-09 PROCEDURE — 85025 COMPLETE CBC W/AUTO DIFF WBC: CPT

## 2020-12-09 PROCEDURE — 65220000003 HC RM SEMIPRIVATE PSYCH

## 2020-12-09 RX ORDER — DIVALPROEX SODIUM 250 MG/1
250 TABLET, DELAYED RELEASE ORAL 2 TIMES DAILY
Status: DISCONTINUED | OUTPATIENT
Start: 2020-12-09 | End: 2020-12-09

## 2020-12-09 RX ORDER — DIVALPROEX SODIUM 500 MG/1
500 TABLET, DELAYED RELEASE ORAL 2 TIMES DAILY
Status: DISCONTINUED | OUTPATIENT
Start: 2020-12-09 | End: 2020-12-12 | Stop reason: HOSPADM

## 2020-12-09 RX ORDER — DULOXETIN HYDROCHLORIDE 30 MG/1
30 CAPSULE, DELAYED RELEASE ORAL DAILY
Status: DISCONTINUED | OUTPATIENT
Start: 2020-12-09 | End: 2020-12-11

## 2020-12-09 RX ADMIN — DIVALPROEX SODIUM 500 MG: 500 TABLET, DELAYED RELEASE ORAL at 21:21

## 2020-12-09 RX ADMIN — MELATONIN TAB 3 MG 3 MG: 3 TAB at 21:21

## 2020-12-09 RX ADMIN — BUPROPION HYDROCHLORIDE 100 MG: 100 TABLET, FILM COATED, EXTENDED RELEASE ORAL at 08:26

## 2020-12-09 RX ADMIN — DULOXETINE HYDROCHLORIDE 30 MG: 30 CAPSULE, DELAYED RELEASE ORAL at 11:39

## 2020-12-09 RX ADMIN — PANTOPRAZOLE SODIUM 40 MG: 40 TABLET, DELAYED RELEASE ORAL at 06:30

## 2020-12-09 RX ADMIN — DIVALPROEX SODIUM 250 MG: 250 TABLET, DELAYED RELEASE ORAL at 11:39
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
## 2020-12-09 NOTE — BH NOTES
Patient reports feeling slightly better with his depression  Has been compliant with his med  Snot having any active si today  Denies any hallu , command    A/p-  Continue therapy      Current Facility-Administered Medications:     pantoprazole (PROTONIX) tablet 40 mg, 40 mg, Oral, ACB, Bijal Park MD, 40 mg at 12/08/20 1107    nicotine (NICODERM CQ) 14 mg/24 hr patch 1 Patch, 1 Patch, TransDERmal, DAILY, Bijal Park MD, 1 Patch at 12/08/20 1107    influenza vaccine 2020-21 (4 yrs+)(PF) (FLUCELVAX QUAD) injection 0.5 mL, 0.5 mL, IntraMUSCular, PRIOR TO DISCHARGE, Bijal Park MD    melatonin tablet 3 mg, 3 mg, Oral, QHS, Bijal Park MD, 3 mg at 12/08/20 2130    buPROPion SR (WELLBUTRIN SR) tablet 100 mg, 100 mg, Oral, DAILY, Bijal Park MD, 100 mg at 12/08/20 0835    chlordiazePOXIDE (LIBRIUM) capsule 10 mg, 10 mg, Oral, TID PRN, Miracle Marroquin MD, 10 mg at 12/08/20 2138

## 2020-12-09 NOTE — BH NOTES
Patient up to the dayroom for meals, ate 100%. Medication compliant. No side effects noted. Attends groups. Spends time on the phone during free time. Social with peers. Frequently walks laps around the unit. Denies SI/HI. Reports feeling better. No physical complaints voiced. Remains on CO. Continue to monitor.
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
## 2020-12-09 NOTE — GROUP NOTE
IP  GROUP DOCUMENTATION INDIVIDUAL Group Therapy Note Date: 12/9/2020 Group Start Time: 1400 Group End Time: 0760 Group Topic: Special Track - Drug Topic [facility] 2 BH NON ACUTE Noe Reddy [facility] GROUP DOCUMENTATION GROUP Group Therapy Note Attendees: 11 out of 14 
 
2pm Substance Use/Abuse Group Patients were taught about Protracted Withdrawal via the use of a handout. Patient's were encouraged to openly discuss their thoughts, feelings, and emotions. Patients were further encouraged to listen to and be supportive of their peers. Attendance: Attended Patient's Goal:  Learn the concept of Protracted Withdrawal, Develop coping skills Interventions/techniques: Informed, Validated, Promoted peer support, Provide feedback, Reinforced and Supported Follows Directions: Followed directions Interactions: Interacted appropriately Mental Status: Calm and Congruent Behavior/appearance: Attentive, Caretaking and Cooperative Goals Achieved: Able to listen to others Additional Notes:  Pt was respectful and attentive during the group meeting. [place]
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
## 2020-12-09 NOTE — BH NOTES
PSYCHIATRIC PROGRESS NOTE         Patient Name  Michele Wiggins   Date of Birth 1980   [facility] [id number]   Medical Record Number  [id number]      Age  41 y.o. PCP None   Admit date:  12/6/2020    Room Number  245/01  @ [facility]   Date of Service  12/9/2020            HISTORY OF PRESENT ILLNESS/INTERVAL HISTORY:    42 y/o male seen at [facility] for MDD  He states that \"today is a wonderful day. \"    Patient today looks energetic and happy today. He states that he relapsed last week, started started spending money that he did not have, and had financial issues, then conflict with wife and daughter. His daughter called him today which makes him very happy, she is 19yo and the last time they talked she was 11yo. Daughter stays with her mother and has a child of her own. He joked about being a grandfather. He states that he works at [employer] with his dad and it is still going well. His goal is to get his mood stabilized on a mood stabilizer. He was on cimbalta on the outside but was never treated with a mood stabilizer. He has a history of lung/heart problems and needs to follow up when he gets out. He has a history of alcohol and cocaine abuse. Patient was counseled on the risks and negative effects of cocaine on the heart. Patient mentioned that the cimbalta was very helpful for him but his insurance had run out. He said he has insurance now and we counseled him on using the meera good RX. Patient denies any manic episode. He was also counseled on the importance of not making inappropriate comments to other patients. He states he has been to every group. MENTAL STATUS EXAM & VITALS     Mental status examination-    Patient is alert, oriented x3   Labile, hypomanic, elated mood  Speech is coherent, moderate volume, no flight of ideas, no loosening of associations. Casually dressed and groomed  No Active suicidal ideations, plan or intent.   No active homicidal ideations plan or intent  No command hallucinations  Thought Processes linear  Not seen responding to any active internal stimuli  No persecutory delusions  Insight limited  Judgement limited               VITALS:     Patient Vitals for the past 24 hrs:   Temp Pulse Resp BP   12/09/20 0822 97.5 °F (36.4 °C) 68 16 119/70   12/09/20 0154 97.5 °F (36.4 °C)  18 114/69   12/08/20 2000 97.5 °F (36.4 °C) 70 18 114/69     Wt Readings from Last 3 Encounters:   12/07/20 68 kg (150 lb)     Temp Readings from Last 3 Encounters:   12/09/20 97.5 °F (36.4 °C)     BP Readings from Last 3 Encounters:   12/09/20 119/70     Pulse Readings from Last 3 Encounters:   12/09/20 68            DATA     LABORATORY DATA:(reviewed/updated 12/9/2020)  Recent Results (from the past 24 hour(s))   CBC WITH AUTOMATED DIFF    Collection Time: 12/09/20  7:33 AM   Result Value Ref Range    WBC 7.5 4.1 - 11.1 K/uL    RBC 5.24 4.10 - 5.70 M/uL    HGB 15.0 12.1 - 17.0 g/dL    HCT 44.2 36.6 - 50.3 %    MCV 84.4 80.0 - 99.0 FL    MCH 28.6 26.0 - 34.0 PG    MCHC 33.9 30.0 - 36.5 g/dL    RDW 13.1 11.5 - 14.5 %    PLATELET 673 981 - 582 K/uL    MPV 10.1 8.9 - 12.9 FL    NEUTROPHILS 64 32 - 75 %    LYMPHOCYTES 25 12 - 49 %    MONOCYTES 8 5 - 13 %    EOSINOPHILS 3 0 - 7 %    BASOPHILS 0 0 - 1 %    IMMATURE GRANULOCYTES 0 0.0 - 0.5 %    ABS. NEUTROPHILS 4.8 1.8 - 8.0 K/UL    ABS. LYMPHOCYTES 1.9 0.8 - 3.5 K/UL    ABS. MONOCYTES 0.6 0.0 - 1.0 K/UL    ABS. EOSINOPHILS 0.2 0.0 - 0.4 K/UL    ABS. BASOPHILS 0.0 0.0 - 0.1 K/UL    ABS. IMM.  GRANS. 0.0 0.00 - 0.04 K/UL    DF AUTOMATED     METABOLIC PANEL, COMPREHENSIVE    Collection Time: 12/09/20  7:33 AM   Result Value Ref Range    Sodium 138 136 - 145 mmol/L    Potassium 4.1 3.5 - 5.1 mmol/L    Chloride 105 97 - 108 mmol/L    CO2 30 21 - 32 mmol/L    Anion gap 3 (L) 5 - 15 mmol/L    Glucose 86 65 - 100 mg/dL    BUN 26 (H) 6 - 20 mg/dL    Creatinine 1.18 0.70 - 1.30 mg/dL    BUN/Creatinine ratio 22 (H) 12 - 20      GFR est AA >60 >60 ml/min/1.73m2    GFR est non-AA >60 >60 ml/min/1.73m2    Calcium 8.9 8.5 - 10.1 mg/dL    Bilirubin, total 0.6 0.2 - 1.0 mg/dL    AST (SGOT) 10 (L) 15 - 37 U/L    ALT (SGPT) 19 12 - 78 U/L    Alk. phosphatase 67 45 - 117 U/L    Protein, total 7.6 6.4 - 8.2 g/dL    Albumin 3.6 3.5 - 5.0 g/dL    Globulin 4.0 2.0 - 4.0 g/dL    A-G Ratio 0.9 (L) 1.1 - 2.2       No results found for: VALF2, VALAC, VALP, VALPR, DS6, CRBAM, CRBAMP, CARB2, XCRBAM  No results found for: LITHM   RADIOLOGY REPORTS:(reviewed/updated 12/9/2020)  No results found. MEDICATIONS     ALL MEDICATIONS:   Current Facility-Administered Medications   Medication Dose Route Frequency    pantoprazole (PROTONIX) tablet 40 mg  40 mg Oral ACB    nicotine (NICODERM CQ) 14 mg/24 hr patch 1 Patch  1 Patch TransDERmal DAILY    influenza vaccine 2020-21 (4 yrs+)(PF) (FLUCELVAX QUAD) injection 0.5 mL  0.5 mL IntraMUSCular PRIOR TO DISCHARGE    melatonin tablet 3 mg  3 mg Oral QHS    buPROPion SR (WELLBUTRIN SR) tablet 100 mg  100 mg Oral DAILY    chlordiazePOXIDE (LIBRIUM) capsule 10 mg  10 mg Oral TID PRN      SCHEDULED MEDICATIONS:   Current Facility-Administered Medications   Medication Dose Route Frequency    pantoprazole (PROTONIX) tablet 40 mg  40 mg Oral ACB    nicotine (NICODERM CQ) 14 mg/24 hr patch 1 Patch  1 Patch TransDERmal DAILY    influenza vaccine 2020-21 (4 yrs+)(PF) (FLUCELVAX QUAD) injection 0.5 mL  0.5 mL IntraMUSCular PRIOR TO DISCHARGE    melatonin tablet 3 mg  3 mg Oral QHS    buPROPion SR (WELLBUTRIN SR) tablet 100 mg  100 mg Oral DAILY          ASSESSMENT & PLAN     Continue close observation  Discussed meds  Increased depakote  Provided support, psycho edu  Discussed with staff in the treatment team, the progress made in therapy, psychosocial needs and nursing concerns.   t.              I certify that this patient's inpatient psychiatric hospital services continue to be, required for treatment that could reasonably be expected to improve the patient's condition.     Signed By:   Latisha Joiner MD  12/9/2020

## 2020-12-10 PROCEDURE — 74011250637 HC RX REV CODE- 250/637: Performed by: PSYCHIATRY & NEUROLOGY

## 2020-12-10 PROCEDURE — 65220000003 HC RM SEMIPRIVATE PSYCH

## 2020-12-10 RX ADMIN — DULOXETINE HYDROCHLORIDE 30 MG: 30 CAPSULE, DELAYED RELEASE ORAL at 08:52

## 2020-12-10 RX ADMIN — MELATONIN TAB 3 MG 3 MG: 3 TAB at 21:23

## 2020-12-10 RX ADMIN — PANTOPRAZOLE SODIUM 40 MG: 40 TABLET, DELAYED RELEASE ORAL at 06:34

## 2020-12-10 RX ADMIN — DIVALPROEX SODIUM 500 MG: 500 TABLET, DELAYED RELEASE ORAL at 21:23

## 2020-12-10 RX ADMIN — DIVALPROEX SODIUM 500 MG: 500 TABLET, DELAYED RELEASE ORAL at 08:52

## 2020-12-10 NOTE — BH NOTES
FAMILY SESSION     The therapist facilitated a family session between the patient and his step-mother Pat. Aston Lawson explained to him how she feels like he needs to go to another residential program for his substance use. She added that his father is not allowing him back to the house until he goes to inpatient treatment. She also spoke about how his substance use is impacting them, and the consequences of his actions. She explained that he is unintentionally putting them in danger for wronging certain people. She also was supportive in saying that when he is sober he normally does really well, and is a \"beautiful person\", although said that she does not want to enable his behavior/substance use. She also mentioned that it seems like he has unresolved feeling with his parents that needs to be addressed, which he agreed with. The patient said that he would willing to go to an inpatient rehab to her. Once the family session ended he told the therapist that he will have to think about things, and \"tie up loose ends\" with people while he is here. He also mentioned still wanting to do outpatient, although still said that he would consider doing inpatient.

## 2020-12-10 NOTE — BH NOTES
COLLATERAL CONTACT    The therapist spoke with the patient's step-mom Marianela over the phone. She said that she wasn't sure exactly why he was admitted, although he told her before he went in that he felt as though he was \"spiraling out of control\". She also said that her and his father were suspicious that he was using drugs again for the past month. She said that his attitude, and appearance will change, and he will start going missing for period of time. She said that she was not aware of any previous suicide attempts that he has had. She said that he has been hospitalized more than a dozen times, and believes that he uses the hospital as a safety net for when things start to become difficult. She said that he has a tendency to date a lot of woman, back to back, and believes that he is searching for love or self-acceptance through those relationships. She said that he needs to learn to love himself, which is something that he struggles with. She also mentioned that he has a lot of unresolved issues with his parents that he needs to work through as well. She confirmed that his ex-girlfriend overdosed in front of him which was traumatic. She was also aware of the girl Shahrzad Beyer who he has been dating recently, confirming that she is a very heavy drinker, and is very toxic for the patient. She said that she is emotionally abusive towards him, and talks badly about him. She also said that she believes that he needs to go to an inpatient rehab program from here, stating that he is putting them in danger with his substance use. She said that recently people have been coming to their shop stating that the patient owes them money, is is worried that something will happen one day. She said that his addiction also makes him very manipulative, and that he can talk his way out of anything. She said that at his baseline he is happy go-usama, although doesn't like addressing his feelings, and will avoid things.

## 2020-12-10 NOTE — BH NOTES
Behavioral Health Treatment Team Note     Patient goal(s) for today: To attend groups  Treatment team focus/goals: To gather collateral/facilitate a family session, and continue providing medication management, and group therapy. Progress note: The patient is presenting with a broad affect and congruent mood. He exhibited more insight, stating that his behaviors indicated to him that he relapsed before he actually used the drug. After the family session he said that he would maybe be interested in going to rehab. The therapist told him that she would start referring him places, and they spoke about different rehab options. LOS:  4  Expected LOS: 5-7 days     Insurance info/prescription coverage:  Medicaid   Date of last family contact:  12/10/20  Family requesting physician contact today:  no  Discharge plan:  The patient is considering going to an inpatient rehab program. If he does not go to a residential program he would go to [facility] instead. Guns in the home:  no   Outpatient provider(s):  None currently.      Participating treatment team members: Leslye López LMSW

## 2020-12-10 NOTE — GROUP NOTE
[facility] GROUP DOCUMENTATION INDIVIDUAL Group Therapy Note Date: 12/10/2020 Group Start Time: 1400 Group End Time: 1500 Group Topic: Special Track - Drug Topic SRM 2  NON ACUTE Noe Reddy [facility] GROUP DOCUMENTATION GROUP Group Therapy Note Attendees: 9 out of 15 
 
2pm Substance Use/Abuse Group Patients learned about the Stages of Change via the use of a handout. Patients were encouraged to discuss their thoughts, feelings, and emotions with the group. Patients were further encouraged to listen to and be supportive of their peers. Attendance: Did not attend Patient's Goal:  N/A Interventions/techniques: Other N/A Follows Directions: Other N/A Interactions: Other N/A Mental Status: Other N/A Behavior/appearance: N/A Goals Achieved: N/A Additional Notes:  Patient did not attend group, despite having been encouraged to do so. [place]

## 2020-12-10 NOTE — BH NOTES
Nursing Note    Patient is alert and oriented x 4. He denies any SI/HI/AH/VH. Patient denies any anxiety or depression. He voiced no complaints. Patient seen watching TV in dayroom with peers, pacing the hallways and talking on the telephone. Staff will continue to monitor patient for safety.

## 2020-12-10 NOTE — GROUP NOTE
IP  GROUP DOCUMENTATION INDIVIDUAL Group Therapy Note Date: 12/10/2020 Group Start Time: 0284 Group End Time: 7091 Group Topic: Recreational/Music Therapy SRM 2  NON ACUTE Tunas Shadow 
 
IP  GROUP DOCUMENTATION GROUP Group Therapy Note Facilitated leisure skills group to reinforce positive coping through music, group activities, social interaction and art tasks Attendees: 6 Attendance: Attended Patient's Goal:  *STG: Attends activities and groups Interventions/techniques: Supported Follows Directions: Followed directions Interactions: Interacted appropriately Mental Status: Calm Behavior/appearance: Cooperative Goals Achieved: Able to engage in interactions and Able to listen to others Additional Notes:  Receptive to listening to music and songs he selected. Pt was in and out of group Ruben Yin

## 2020-12-10 NOTE — GROUP NOTE
[facility]  GROUP DOCUMENTATION INDIVIDUAL Group Therapy Note Date: 12/10/2020 Group Start Time: 1100 Group End Time: 0008 Group Topic: Education Group - Inpatient [facility] hospital Group Catherine CINTRON  GROUP DOCUMENTATION GROUP Group Therapy Note Attendees: All pts were encouraged to attend, however only 8 attended. One pt was brought up from the back and another from another floor via ipad. The topic was cognitive distortions. We started group with an introduction and stating their goal for the day. We went through all the different automatic negative thoughts and some gave examples of their own. In the end we reflected on how group went. Attendance: Did not attend Anna Marie Roca

## 2020-12-10 NOTE — BH NOTES
Patient up to the dayroom for meals, ate 100%. Attends the majority of the groups. Medication compliant. No side effects noted. Social with select peers. Noted frequently talking on the phone during free time. Denies SI/HI. Denies cravings. No physical complaints voiced. Presently sitting in the dayroom talking on the phone. Remains on CO. Continue to monitor.

## 2020-12-10 NOTE — GROUP NOTE
[facility] GROUP DOCUMENTATION INDIVIDUAL Group Therapy Note Date: 12/10/2020 Group Start Time: 1500 Group End Time: 4810 Group Topic: Process Group - Inpatient [facility] Yumiko SHERMAN 
 
[facility] GROUP DOCUMENTATION GROUP Group Therapy Note Attendees: Patients used time to discuss how they came to the hospital, the things they have been going through and their emotions related to their situations. Patients processed their feelings. Patients encouraged to listen and give feedback to peers. Themes surrounding forgiveness, trauma, and trust were discussed. Attendance: Attended Late Patient's Goal:  Verbalizes alternative ways of dealing with maladaptive feelings/behaviors Interventions/techniques: Validated, Promoted peer support, Reinforced and Supported Follows Directions: Followed directions Interactions: Interacted appropriately Mental Status: Blunted, Depressed, Preoccupied and Restricted Behavior/appearance: Attentive, Cooperative and Negative Goals Achieved: Able to engage in interactions, Able to listen to others, Able to reflect/comment on own behavior, Able to manage/cope with feelings, Able to receive feedback, Able to experience relief/decrease in symptoms, Able to self-disclose, Discussed coping, Discussed discharge plans, Identified feelings and Identified relapse prevention strategies Additional Notes:  Pt attended group and was engaged. Pt shared about how he had relapsed and immediatly came to the hospital to get help because he realized he made a mistake. Pt shared that he is angry because his family and  are telling him he should go to an inpatient rehab program following discharge. Pt reports he wants to do IOP so he can also work, feels like decisions are being made for him. Pt withdrawn throughout most of group. Kal Higgins

## 2020-12-11 PROCEDURE — 74011250637 HC RX REV CODE- 250/637: Performed by: PSYCHIATRY & NEUROLOGY

## 2020-12-11 PROCEDURE — 65220000003 HC RM SEMIPRIVATE PSYCH

## 2020-12-11 RX ORDER — DULOXETIN HYDROCHLORIDE 30 MG/1
60 CAPSULE, DELAYED RELEASE ORAL DAILY
Status: DISCONTINUED | OUTPATIENT
Start: 2020-12-12 | End: 2020-12-12 | Stop reason: HOSPADM

## 2020-12-11 RX ADMIN — PANTOPRAZOLE SODIUM 40 MG: 40 TABLET, DELAYED RELEASE ORAL at 06:32

## 2020-12-11 RX ADMIN — MELATONIN TAB 3 MG 3 MG: 3 TAB at 21:21

## 2020-12-11 RX ADMIN — DIVALPROEX SODIUM 500 MG: 500 TABLET, DELAYED RELEASE ORAL at 08:53

## 2020-12-11 RX ADMIN — DULOXETINE HYDROCHLORIDE 30 MG: 30 CAPSULE, DELAYED RELEASE ORAL at 08:53

## 2020-12-11 RX ADMIN — DIVALPROEX SODIUM 500 MG: 500 TABLET, DELAYED RELEASE ORAL at 21:21

## 2020-12-11 NOTE — GROUP NOTE
[facility] GROUP DOCUMENTATION INDIVIDUAL Group Therapy Note Date: 12/11/2020 Group Start Time: 1100 Group End Time: 1200 Group Topic: Education Group - Inpatient [facility] Sara Dhaliwal [facility] GROUP DOCUMENTATION GROUP Group Therapy Note Attendees: 6 Psych Ed: Defense Mechanisms- pts were asked what a goal for the day was as a check in question. Pts were then walked through the defense mechanism worksheet and encouraged to share what defense mechanisms they identified with. Pts were then walked through a breathing exercise and asked to reflect on group Attendance: Attended Patient's Goal:  Pt said that his goal is to 'find out what the discharge plan is' Interventions/techniques: Validated, Promoted peer support, Provide feedback and Supported Follows Directions: Followed directions Interactions: Interacted appropriately Mental Status: Calm and Congruent Behavior/appearance: Attentive and Withdrawn/quiet Goals Achieved: Able to engage in interactions and Able to listen to others Additional Notes:  Pt was quiet throughout most of group. Pt and peer spoke about the  being a source of 'comfort and pride' for them towards the end of group. Pt spoke about wanting to find a therapist that understands him. Pt is making progress towards goals by attending and participating in group [facility]

## 2020-12-11 NOTE — PROGRESS NOTES
Indra goal is to learn and to practice his boundaries with family and friends. No suicidal thoughts, depression, or hallucination was communicated.

## 2020-12-11 NOTE — PROGRESS NOTES
Patient denied SI/HI this shift. He has been visible on the unit and social with his peers. He has been on the phone throughout the day. Patient stated he is trying to get in to the [place] because he would rather do that after discharge than go to a rehab 3 hours away. He stated he was in a good mood today because he got to speak to his child. He attends groups. He is medication compliant. Will continue to be available.

## 2020-12-11 NOTE — GROUP NOTE
[facility] GROUP DOCUMENTATION INDIVIDUAL Group Therapy Note Date: 12/11/2020 Group Start Time: 1500 Group End Time: 3035 Group Topic: Process Group - Inpatient [facility] Ivettkaty SHERMAN 
 
[facility] GROUP DOCUMENTATION GROUP Group Therapy Note Attendees: Patients used time to discuss their current situations, how they came to the hospital, how they are feeling now. Patients encouraged to give each other feedback and process how they are feeling. Themes surrounding frustration, feeling helpless, and anger arose and were discussed. Attendance: Attended Patient's Goal:  Verbalizes alternative ways of dealing with maladaptive feelings/behaviors Interventions/techniques: Validated, Promoted peer support, Reinforced and Supported Follows Directions: Followed directions Interactions: Interacted appropriately Mental Status: Calm and Depressed Behavior/appearance: Agitated, Attentive, Cooperative and Negative Goals Achieved: Able to engage in interactions, Able to listen to others, Able to give feedback to another, Able to reflect/comment on own behavior, Able to manage/cope with feelings, Able to receive feedback, Able to experience relief/decrease in symptoms, Able to self-disclose, Discussed coping, Discussed discharge plans, Displayed empathy, Identified feelings and Identified triggers Additional Notes:  Pt attended group and was engaged. Pt shared about how he is frustrated that he has not seen his . Pt reports that he had called several rehab facilities, reports that he is irritated because they said they needed to speak with his . Pt supported by group. Tae Mccloud

## 2020-12-11 NOTE — GROUP NOTE
[facility] GROUP DOCUMENTATION INDIVIDUAL Group Therapy Note Date: 12/11/2020 Group Start Time: 3759 Group End Time: 1400 Group Topic: Recreational/Music Therapy SRM 2  NON ACUTE Lanora Coil 
 
[facility] GROUP DOCUMENTATION GROUP Group Therapy Note Facilitated leisure skills group to reinforce positive coping through music, social interaction, group activities and art tasks Attendees: 4 Attendance: Attended Patient's Goal: *STG: Attends activities and groups Interventions/techniques: Supported Follows Directions: Followed directions Interactions: Interacted appropriately Mental Status: Calm Behavior/appearance: Cooperative Goals Achieved: Able to engage in interactions and Able to listen to others Additional Notes:  Receptive to listening to music and songs he selected. Declined to work on art tasks Mackenzie Alcantara

## 2020-12-11 NOTE — BH NOTES
Mr. Saurav Casey 25-year-old  male admitted to the behavioral health floor for depression suicidal ideation and substance abuse intoxication. He continues to be compliant with his medications still labile. He has been getting into trouble as people are looking for him asking for money and his place of work. Patient wanting to be discharged today. As per the family meeting family does not want him to come home until he gets inpatient program.  Patient is most focused on wanting to be discharged  Mental status examination patient is alert oriented x3 speech is coherent normal tone rate volume light of ideas no evidence of any active psychosis.   Still labile with loose associations insight judgment remains impaired    Assessment plan family meeting  Continue inpatient therapy  Depakote level to be ordered  Increase Cymbalta to 60 mg p.o. daily  Discharge planning to transition to crisis inpatient rehab      Current Facility-Administered Medications:     [START ON 12/12/2020] DULoxetine (CYMBALTA) capsule 60 mg, 60 mg, Oral, DAILY, Bijal Park MD    divalproex DR (DEPAKOTE) tablet 500 mg, 500 mg, Oral, BID, Bijal Park MD, 500 mg at 12/11/20 0853    pantoprazole (PROTONIX) tablet 40 mg, 40 mg, Oral, ACB, Bijal Park MD, 40 mg at 12/11/20 4880    nicotine (NICODERM CQ) 14 mg/24 hr patch 1 Patch, 1 Patch, TransDERmal, DAILY, Bijal Park MD, 1 Patch at 12/11/20 0854    influenza vaccine 2020-21 (4 yrs+)(PF) (FLUCELVAX QUAD) injection 0.5 mL, 0.5 mL, IntraMUSCular, PRIOR TO DISCHARGE, Bijal Park MD    melatonin tablet 3 mg, 3 mg, Oral, QHS, Bijal Park MD, 3 mg at 12/10/20 2123    chlordiazePOXIDE (LIBRIUM) capsule 10 mg, 10 mg, Oral, TID PRN, Lennie Holstein, MD, 10 mg at 12/08/20 2138

## 2020-12-11 NOTE — GROUP NOTE
[facility] GROUP DOCUMENTATION INDIVIDUAL Group Therapy Note Date: 12/11/2020 Group Start Time: 1400 Group End Time: 3190 Group Topic: Special Track - Drug Topic [facility] Femi Delacruz R 
 
[facility] GROUP DOCUMENTATION GROUP Group Therapy Note Attendees: Patients used time to discuss the change plan worksheet. Patents encouraged to discuss the changes they want to make in their life in regard to their substance use, come up with actions they can take to make that change. Themes surrounding readiness for change, frustration with medications, frustration with self were discussed. Attendance: Attended Patient's Goal:  Verbalizes alternative ways of dealing with maladaptive feelings/behaviors Interventions/techniques: Challenged, Informed, Validated, Promoted peer support, Reinforced and Supported Follows Directions: Followed directions Interactions: Interacted appropriately Mental Status: Elevated Behavior/appearance: Attentive, Cooperative and Enthusiastic Goals Achieved: Able to engage in interactions, Able to listen to others, Able to give feedback to another, Able to reflect/comment on own behavior, Able to manage/cope with feelings, Able to receive feedback, Able to experience relief/decrease in symptoms, Able to self-disclose, Discussed coping, Displayed empathy, Identified feelings, Identified triggers, Identified relapse prevention strategies and Identified distorted thoughts/beliefs Additional Notes:  Pt attended group and was engaged. Pt reports that he is tired of the cycle of substance abuse. Pt reports that it could be a good or a bad thing that triggers him to use. Pt reports that in order to change the cycle, he needs to change his people, places, and things. Savanah Archuleta

## 2020-12-11 NOTE — BH NOTES
Behavioral Health Treatment Team Note     Patient goal(s) for today: Discharge planning   Treatment team focus/goals: To facilitate discharge planning, and facilitate placement. Progress note: The patient was initially irritable when meeting with the therapist. He explained that he had been contacting rehab programs all day with no luck. He said that the majority of them have a two week waitlist, or don't accept his insurance. The therapist explained that the staff would like to step him down to a crisis program on Monday, and that he was referred to [address] [place] [address] who can take him then. He said that he did not want to stay until Monday because he has to do things in the community before going anywhere. The therapist explained that his discharge would be AMA if he were to do that, because the risk of him relapsing would be high. He wanted to speak to [facility] directly, so the therapist gave him their number. He said that he at least wanted to stay until Sunday. LOS:  5  Expected LOS: 5-7 days     Insurance info/prescription coverage:  [facility]   Date of last family contact:  12/10/20  Family requesting physician contact today:  no  Discharge plan: The tx team is suggesting that he steps down to a crisis stabilization program on Monday. He might leave AMA before then due to unwillingness to wait, and stating that he needs to do things in the community before he goes anywhere. Guns in the home:  no   Outpatient provider(s):  None currently.      Participating treatment team members: Adi Covington, LAUREN

## 2020-12-11 NOTE — BH NOTES
Patient 64-year-old  male admitted to behavioral health for bipolar depression and alcohol use cocaine abuse. Patient states he has been compliant with his medications. He states his medicines has been helpful. Has not voiced any active suicidal homicidal ideation. He is been continuing to wanting to leave Friday.     Assessment plan  Encouraged group therapy currently tolerating medications family meeting

## 2020-12-11 NOTE — BH NOTES
Patient has been up in the day room for most of the shift and on the telephone. He is alert & oriented X 4. He denies SI and HI. He compliant with medication. Patient wanted same names & telephone numbers to treatment program. Patient was direct to talk with his  in the morning. He on close observation for safety.

## 2020-12-12 VITALS
BODY MASS INDEX: 24.11 KG/M2 | HEART RATE: 73 BPM | SYSTOLIC BLOOD PRESSURE: 120 MMHG | OXYGEN SATURATION: 98 % | WEIGHT: 150 LBS | TEMPERATURE: 97.6 F | HEIGHT: 66 IN | DIASTOLIC BLOOD PRESSURE: 84 MMHG | RESPIRATION RATE: 18 BRPM

## 2020-12-12 PROCEDURE — 74011250637 HC RX REV CODE- 250/637: Performed by: PSYCHIATRY & NEUROLOGY

## 2020-12-12 RX ADMIN — DIVALPROEX SODIUM 500 MG: 500 TABLET, DELAYED RELEASE ORAL at 08:34

## 2020-12-12 RX ADMIN — DULOXETINE HYDROCHLORIDE 60 MG: 30 CAPSULE, DELAYED RELEASE ORAL at 08:34

## 2020-12-12 RX ADMIN — PANTOPRAZOLE SODIUM 40 MG: 40 TABLET, DELAYED RELEASE ORAL at 06:38

## 2020-12-12 NOTE — BH NOTES
DISCHARGE SUMMARY    NAME:Indra Flanagan  : 1980  MRN: [id number]    The patient Kailyn Keyes exhibits the ability to control behavior in a less restrictive environment. Patient's level of functioning is improving. No assaultive/destructive behavior has been observed for the past 24 hours. No suicidal/homicidal threat or behavior has been observed for the past 24 hours. There is no evidence of serious medication side effects. Patient has not been in physical or protective restraints for at least the past 24 hours. If weapons involved, how are they secured? Pt denies access to weapons. Is patient aware of and in agreement with discharge plan? Yes    Arrangements for medication:  Prescriptions given to patient, given a weeks supply or 30 day supply. Copy of discharge instructions to provider?:  Yes    Arrangements for transportation home:  Pt to be picked up by mother. Keep all follow up appointments as scheduled, continue to take prescribed medications per physician instructions.   Mental health crisis number:  863 or your local mental health crisis line number at [phone]

## 2020-12-12 NOTE — BH NOTES
Nursing Note    Patient is alert and oriented x 4. He denies any SI/HI/AH/VH. Patient denies any anxiety or depression. Broad affect and calm mood. Patient seen watching TV and talking on the telephone in the dayroom. Staff will continue to monitor patient for safety.

## 2020-12-12 NOTE — BH NOTES
SAFETY PLAN    A suicide Safety Plan is a document that supports someone when they are having thoughts of suicide. Warning Signs that indicate a suicidal crisis may be developing: What (situations, thoughts, feelings, body sensations, behaviors, etc.) do you experience that lets you know you are beginning to think about suicide? 1. isolation  2. Easily angered  3. I get a case of the \"I don't give a f's\"    Internal Coping Strategies:  What things can I do (relaxation techniques, hobbies, physical activities, etc.) to take my mind off my problems without contacting another person? 1. Go to the [employer] and enjoy the horsepower of the cars  2. Listen to music  3. Work on cars at [employer] and social settings that provide distraction: Who can I call or where can I go to distract me? 1. Name: Pamila Pac  Phone: Number in phone  2. Name: Attila Cueva  Phone: Number in phone   3. Place: [place]            4. Place: The shop    People whom I can ask for help: Who can I call when I need help - for example, friends, family, clergy, someone else? 1. Name: Cityblisaan Pac                Phone: Number in phone   2. Name: Diane Crowe  Phone: Number in phone   3. Name: Mae Vazquez  Phone: Number in phone     Professionals or [address] [place] [address] I can contact during a crisis: Who can I call for help - for example, my doctor, my psychiatrist, my psychologist, a mental health provider, a suicide hotline? 1. Clinician Name: [facility]   Phone: [phone]      Clinician Pager or Emergency Contact #: n/a    2. Clinician Name: [place] [address]   Phone: [phone]      Clinician Pager or Emergency Contact #: n/a    3. Suicide Prevention Lifeline: 0-036-732-TALK (0371)    4.  [address] [place] [address] Emergency Services -  for example, [facility] suicide hotline, [facility] Hotline: [employer]      Emergency Services Address: [address]      Emergency Services Phone: [phone]    Making the environment safe: How can I make my environment (house/apartment/living space) safer? For example, can I remove guns, medications, and other items? 1. Only be around SAFE people  2.  Work, work, work

## 2020-12-12 NOTE — BH NOTES
Behavioral Health Transition Record to Provider    Patient Name: Pro Post  YOB: 1980  Medical Record Number: [id number]  Date of Admission: 12/6/2020  Date of Discharge: 12/12/2020    Attending Provider: Lord Ash MD  Discharging Provider: Dr. Brant Montoya  To contact this individual call [phone] and ask the  to page. If unavailable, ask to be transferred to [address] [place] [address] on call. A Behavioral Health Provider will be available on call 24/7 and during holidays     Primary Care Provider: None    No Known Allergies    Admission Diagnosis: MDD (major depressive disorder) [F32.9]    * No surgery found *    Results for orders placed or performed during the hospital encounter of 12/06/20   SARS-COV-2, PCR    Specimen: Nasopharyngeal   Result Value Ref Range    Specimen source Nasopharyngeal      SARS-CoV-2 Not detected Not detected   METABOLIC PANEL, BASIC   Result Value Ref Range    Sodium 137 136 - 145 mmol/L    Potassium 3.2 (L) 3.5 - 5.1 mmol/L    Chloride 100 97 - 108 mmol/L    CO2 32 21 - 32 mmol/L    Anion gap 5 5 - 15 mmol/L    Glucose 99 65 - 100 mg/dL    BUN 13 6 - 20 mg/dL    Creatinine 1.16 0.70 - 1.30 mg/dL    BUN/Creatinine ratio 11 (L) 12 - 20      GFR est AA >60 >60 ml/min/1.73m2    GFR est non-AA >60 >60 ml/min/1.73m2    Calcium 9.5 8.5 - 10.1 mg/dL   ETHYL ALCOHOL   Result Value Ref Range    ALCOHOL(ETHYL),SERUM <4 <10 mg/dL   CBC WITH AUTOMATED DIFF   Result Value Ref Range    WBC 13.7 (H) 4.1 - 11.1 K/uL    RBC 5.39 4. 10 - 5.70 M/uL    HGB 15.9 12.1 - 17.0 g/dL    HCT 45.8 36.6 - 50.3 %    MCV 85.0 80.0 - 99.0 FL    MCH 29.5 26.0 - 34.0 PG    MCHC 34.7 30.0 - 36.5 g/dL    RDW 13.4 11.5 - 14.5 %    PLATELET 185 541 - 095 K/uL    MPV 10.0 8.9 - 12.9 FL    NEUTROPHILS 71 32 - 75 %    LYMPHOCYTES 20 12 - 49 %    MONOCYTES 8 5 - 13 %    EOSINOPHILS 1 0 - 7 %    BASOPHILS 0 0 - 1 %    IMMATURE GRANULOCYTES 0 0.0 - 0.5 %    ABS.  NEUTROPHILS 9.7 (H) 1.8 - 8.0 K/UL ABS. LYMPHOCYTES 2.8 0.8 - 3.5 K/UL    ABS. MONOCYTES 1.1 (H) 0.0 - 1.0 K/UL    ABS. EOSINOPHILS 0.1 0.0 - 0.4 K/UL    ABS. BASOPHILS 0.0 0.0 - 0.1 K/UL    ABS. IMM. GRANS. 0.0 0.00 - 0.04 K/UL    DF AUTOMATED     DRUG SCREEN, URINE   Result Value Ref Range    AMPHETAMINES Negative Negative      BARBITURATES Negative Negative      BENZODIAZEPINES Negative Negative      COCAINE Positive (A) Negative      METHADONE Negative Negative      OPIATES Negative Negative      PCP(PHENCYCLIDINE) Negative Negative      THC (TH-CANNABINOL) Negative Negative      Drug screen comment        This test is a screen for drugs of abuse in a medical setting only (i.e., they are unconfirmed results and as such must not be used for non-medical purposes, e.g.,employment testing, legal testing). Due to its inherent nature, false positive (FP) and false negative (FN) results may be obtained. Therefore, if necessary for medical care, recommend confirmation of positive findings by GC/MS. SARS-COV-2   Result Value Ref Range    SARS-CoV-2 Please find results under separate order     CBC WITH AUTOMATED DIFF   Result Value Ref Range    WBC 7.5 4.1 - 11.1 K/uL    RBC 5.24 4.10 - 5.70 M/uL    HGB 15.0 12.1 - 17.0 g/dL    HCT 44.2 36.6 - 50.3 %    MCV 84.4 80.0 - 99.0 FL    MCH 28.6 26.0 - 34.0 PG    MCHC 33.9 30.0 - 36.5 g/dL    RDW 13.1 11.5 - 14.5 %    PLATELET 965 020 - 369 K/uL    MPV 10.1 8.9 - 12.9 FL    NEUTROPHILS 64 32 - 75 %    LYMPHOCYTES 25 12 - 49 %    MONOCYTES 8 5 - 13 %    EOSINOPHILS 3 0 - 7 %    BASOPHILS 0 0 - 1 %    IMMATURE GRANULOCYTES 0 0.0 - 0.5 %    ABS. NEUTROPHILS 4.8 1.8 - 8.0 K/UL    ABS. LYMPHOCYTES 1.9 0.8 - 3.5 K/UL    ABS. MONOCYTES 0.6 0.0 - 1.0 K/UL    ABS. EOSINOPHILS 0.2 0.0 - 0.4 K/UL    ABS. BASOPHILS 0.0 0.0 - 0.1 K/UL    ABS. IMM.  GRANS. 0.0 0.00 - 0.04 K/UL    DF AUTOMATED     METABOLIC PANEL, COMPREHENSIVE   Result Value Ref Range    Sodium 138 136 - 145 mmol/L    Potassium 4.1 3.5 - 5.1 mmol/L Chloride 105 97 - 108 mmol/L    CO2 30 21 - 32 mmol/L    Anion gap 3 (L) 5 - 15 mmol/L    Glucose 86 65 - 100 mg/dL    BUN 26 (H) 6 - 20 mg/dL    Creatinine 1.18 0.70 - 1.30 mg/dL    BUN/Creatinine ratio 22 (H) 12 - 20      GFR est AA >60 >60 ml/min/1.73m2    GFR est non-AA >60 >60 ml/min/1.73m2    Calcium 8.9 8.5 - 10.1 mg/dL    Bilirubin, total 0.6 0.2 - 1.0 mg/dL    AST (SGOT) 10 (L) 15 - 37 U/L    ALT (SGPT) 19 12 - 78 U/L    Alk. phosphatase 67 45 - 117 U/L    Protein, total 7.6 6.4 - 8.2 g/dL    Albumin 3.6 3.5 - 5.0 g/dL    Globulin 4.0 2.0 - 4.0 g/dL    A-G Ratio 0.9 (L) 1.1 - 2.2         Immunizations administered during this encounter: There is no immunization history for the selected administration types on file for this patient. Screening for Metabolic Disorders for Patients on Antipsychotic Medications  (Data obtained from the EMR)    Estimated Body Mass Index  Estimated body mass index is 24.21 kg/m² as calculated from the following:    Height as of this encounter: 5' 6\" (1.676 m). Weight as of this encounter: 68 kg (150 lb). Vital Signs/Blood Pressure  Visit Vitals  /84   Pulse 73   Temp 97.6 °F (36.4 °C)   Resp 18   Ht 5' 6\" (1.676 m)   Wt 68 kg (150 lb)   SpO2 98%   BMI 24.21 kg/m²       Hemoglobin A1c  No results found for: HBA1C, HGBE8, DUH1THVX     Lipid Panel  No results found for: CHOL, CHOLX, CHLST, CHOLV, 287441, HDL, HDLP, LDL, LDLC, DLDLP, TGLX, TRIGL, TRIGP, CHHD, CHHDX    Discharge Diagnosis: Major depressive disorder, moderate recurrent, Cocaine abuse, Alcohol abuse    Discharge Plan: Patient to discharge home AMA. Pt has made own arrangements to go to rehab on Monday. Discharge Medication List and Instructions: There are no discharge medications for this patient.       Unresulted Labs (24h ago, onward)    None        To obtain results of studies pending at discharge, please contact [phone]    Follow-up Information     Follow up With Specialties Details Why Contact Info    None    None ([phone]) Patient stated that they have no PCP            Advanced Directive:   Does the patient have an appointed surrogate decision maker? No  Does the patient have a Medical Advance Directive? No  Does the patient have a Psychiatric Advance Directive? No  If the patient does not have a surrogate or Medical Advance Directive AND Psychiatric Advance Directive, the patient was offered information on these advance directives Patient will complete at a later time      Patient Instructions: Please continue all medications until otherwise directed by physician. Go to rehab on Monday. Tobacco Cessation Discharge Plan:   Is the patient a smoker and needs referral for smoking cessation? Refused  Patient referred to the following for smoking cessation with an appointment? Refused     Patient was offered medication to assist with smoking cessation at discharge? Refused  Was education for smoking cessation added to the discharge instructions? Refused    Alcohol/Substance Abuse Discharge Plan:   Does the patient have a history of substance/alcohol abuse and requires a referral for treatment? Refused  Patient referred to the following for substance/alcohol abuse treatment with an appointment? Refused  Patient was offered medication to assist with alcohol cessation at discharge? Refused  Was education for substance/alcohol abuse added to discharge instructions?  Refused    Patient discharged to Home; Other Patient leaving AMA

## 2020-12-12 NOTE — BH NOTES
Patient signed  A. M.A. paperwork, he was given his personal belongings and valuables. Patient left the unit ambulatory, escorted out of the ED door.

## 2020-12-12 NOTE — BH NOTES
Patient requesting to sign out against medical advice. Dr. Yuniel Diaz notified and orders were received to discharge patient A. M.A. Case management had patient do a safety plan.

## 2020-12-12 NOTE — BH NOTES
Patient pleasant upon approach, affect broad, medication compliant and social with peers. Patient was on the phone most of the morning trying to find a 30 day program so he sign out and leave today. Patient denied SI, HI, AVH, and depression. He rated anxiety at a 2 on a scale of 0-10. Patient remains on close  Observation, Q 15 minute checks.

## 2020-12-12 NOTE — GROUP NOTE
IP  GROUP DOCUMENTATION INDIVIDUAL Group Therapy Note Date: 12/12/2020 Group Start Time: 1100 Group End Time: 2259 Group Topic: Education Group - Inpatient [facility] 2  NON ACUTE Radha Never IP  GROUP DOCUMENTATION GROUP Group Therapy Note Attendees: 5 Introduced Thought Record as positive way to challenge Automatic Thoughts and replace with more accurate thoughts as way of coping. Attendance: Attended Patient's Goal:  Verbalizes alternative ways of dealing with maladaptive feelings/behaviors Interventions/techniques: Informed, Provide feedback and Supported Follows Directions: Followed directions Interactions: Other sat with body turned towards window away from  and made no eye contact Mental Status: Flat and Preoccupied Behavior/appearance: Withdrawn/quiet Goals Achieved: Able to listen to others Additional Notes: Attended group. Joined group after peers in group already. Accepted materials provided. Pt sat with back to  and looked out the window entire session. Made no comment to staff or peers. At end of session, pt was asked if he was ok and stated he was alright.   
 
Hayley Jones, CTRS

## 2020-12-12 NOTE — GROUP NOTE
[facility] GROUP DOCUMENTATION INDIVIDUAL Group Therapy Note Date: 12/12/2020 Group Start Time: 1300 Group End Time: 0836 Group Topic: Process Group - Inpatient [facility] Dawn Reveal R 
 
[facility] GROUP DOCUMENTATION GROUP Group Therapy Note Attendees: Patients used time to discuss their situations, how they came to the hospital , and how they are feeling now. Patients encouraged to use time to process their feelings and support each other. Themes surrounding being \"human\" and frustration with the system arose. Attendance: Attended Patient's Goal:  Attends activities and groups Interventions/techniques: Validated, Promoted peer support, Reinforced and Supported Follows Directions: Followed directions Interactions: Interacted appropriately Mental Status: Calm and Congruent Behavior/appearance: Attentive, Caretaking, Cooperative, Enthusiastic and Motivated Goals Achieved: Able to engage in interactions, Able to listen to others, Able to give feedback to another, Able to reflect/comment on own behavior, Able to manage/cope with feelings, Able to receive feedback, Able to experience relief/decrease in symptoms, Able to self-disclose, Discussed coping, Discussed discharge plans, Discussed self-esteem issues, Discussed safety plan, Displayed empathy, Identified feelings, Identified triggers, Identified relapse prevention strategies, Identified medication adherence strategies, Identified resources and support systems, Identified distorted thoughts/beliefs, Increased hopefulness and Verbalized increased hopefulness Additional Notes:  Pt attended group and was engaged. Pt shared about his frustrations with the system, reported feeling frustrated with his .  Pt shared how he had been calling lots of different rehab places himself and his frustration with being turned down by multiple places. Pt supported by group. [facility]

## 2021-01-04 NOTE — DISCHARGE SUMMARY
PSYCHIATRIC DISCHARGE SUMMARY         IDENTIFICATION:    Patient Name  Pro Post   Date of Birth 1980   [facility] [id number]   Medical Record Number  [id number]      Age  41 y.o. PCP None   Admit date:  12/6/2020    Discharge date: 1/4/2021   Room Number  245/01  @ [facility]   Date of Service  1/4/2021            TYPE OF DISCHARGE: REGULAR               CONDITION AT DISCHARGE: stable       PROVISIONAL & DISCHARGE DIAGNOSES:    Major depressive disorder, moderate recurrent  Cocaine abuse  Alcohol abuse       CC & HISTORY OF PRESENT ILLNESS:  HISTORY OF PRESENT ILLNESS:    The patient, Pro Post, is a 41 y.o. BLACK OR  male seen this morning in the emergency department for psychiatric assessment. Patient presented feeling increasingly depressed suicidal with a plan to hang himself stating \"there are plenty of trees around\". Patient reports he has not been feeling well and recently lost his ex-girlfriend who passed away a week ago and another friend passed a few months ago. He expresses he has been struggling with life in general.  He has been increasingly isolating withdrawing from others. He expresses poor sleep poor appetite. He reports losing about 30 pounds in the last month. He is also been using cocaine around $400 on and off.   He also endorses regular use of alcohol.     Denies any command hallucinations denies any paranoia or persecutory delusions     SOCIAL HISTORY:    Social History     Socioeconomic History    Marital status: SINGLE     Spouse name: Not on file    Number of children: Not on file    Years of education: Not on file    Highest education level: Not on file   Occupational History    Not on file   Social Needs    Financial resource strain: Not on file    Food insecurity     Worry: Not on file     Inability: Not on file    Transportation needs     Medical: Not on file     Non-medical: Not on file   Tobacco Use    Smoking status: Not on file   Substance and Sexual Activity    Alcohol use: Not on file    Drug use: Not on file    Sexual activity: Not on file   Lifestyle    Physical activity     Days per week: Not on file     Minutes per session: Not on file    Stress: Not on file   Relationships    Social connections     Talks on phone: Not on file     Gets together: Not on file     Attends Muslim service: Not on file     Active member of club or organization: Not on file     Attends meetings of clubs or organizations: Not on file     Relationship status: Not on file    Intimate partner violence     Fear of current or ex partner: Not on file     Emotionally abused: Not on file     Physically abused: Not on file     Forced sexual activity: Not on file   Other Topics Concern    Not on file   Social History Narrative    Not on file      FAMILY HISTORY:   No family history on file. HOSPITALIZATION COURSE:    Андрей Story was admitted to the inpatient psychiatric unit [facility] for acute psychiatric stabilization in regards to symptomatology as described in the HPI above. While on the unit Андрей Story was involved in individual, group, occupational and milieu therapy. Psychiatric medications were adjusted during this hospitalization. Андрей Story demonstrated a slow, but progressive improvement in overall condition. Much of patient's depression appeared to be related to situational stressors, effects of drugs of abuse, and psychological factors. Please see individual progress notes for more specific details regarding patient's hospitalization course. At time of discharge, Андрей Story is without significant problems of depression, psychosis, cesilia. Patient free of suicidal and homicidal ideations. Patient with request for discharge today. There are no grounds to seek a TDO. Patient has maximized benefit to be derived from acute inpatient psychiatric treatment.   All members of the treatment team concur with each other in regards to plans for discharge today per patient's request.  Patient and family are aware and in agreement with discharge and discharge plan. LABS AND IMAGAING:    Labs Reviewed   METABOLIC PANEL, BASIC - Abnormal; Notable for the following components:       Result Value    Potassium 3.2 (*)     BUN/Creatinine ratio 11 (*)     All other components within normal limits   CBC WITH AUTOMATED DIFF - Abnormal; Notable for the following components:    WBC 13.7 (*)     ABS. NEUTROPHILS 9.7 (*)     ABS.  MONOCYTES 1.1 (*)     All other components within normal limits   DRUG SCREEN, URINE - Abnormal; Notable for the following components:    COCAINE Positive (*)     All other components within normal limits   METABOLIC PANEL, COMPREHENSIVE - Abnormal; Notable for the following components:    Anion gap 3 (*)     BUN 26 (*)     BUN/Creatinine ratio 22 (*)     AST (SGOT) 10 (*)     A-G Ratio 0.9 (*)     All other components within normal limits   SARS-COV-2, PCR   ETHYL ALCOHOL   SARS-COV-2   CBC WITH AUTOMATED DIFF     No results found for: DS35, PHEN, PHENO, PHENT, DILF, DS39, PHENY, PTN, VALF2, VALAC, VALP, VALPR, DS6, CRBAM, CRBAMP, CARB2, XCRBAM  Admission on 12/06/2020, Discharged on 12/12/2020   Component Date Value Ref Range Status    Sodium 12/06/2020 137  136 - 145 mmol/L Final    Potassium 12/06/2020 3.2* 3.5 - 5.1 mmol/L Final    Chloride 12/06/2020 100  97 - 108 mmol/L Final    CO2 12/06/2020 32  21 - 32 mmol/L Final    Anion gap 12/06/2020 5  5 - 15 mmol/L Final    Glucose 12/06/2020 99  65 - 100 mg/dL Final    BUN 12/06/2020 13  6 - 20 mg/dL Final    Creatinine 12/06/2020 1.16  0.70 - 1.30 mg/dL Final    BUN/Creatinine ratio 12/06/2020 11* 12 - 20   Final    GFR est AA 12/06/2020 >60  >60 ml/min/1.73m2 Final    GFR est non-AA 12/06/2020 >60  >60 ml/min/1.73m2 Final    Calcium 12/06/2020 9.5  8.5 - 10.1 mg/dL Final    ALCOHOL(ETHYL),SERUM 12/06/2020 <4  <10 mg/dL Final  WBC 12/06/2020 13.7* 4.1 - 11.1 K/uL Final    RBC 12/06/2020 5.39  4. 10 - 5.70 M/uL Final    HGB 12/06/2020 15.9  12.1 - 17.0 g/dL Final    HCT 12/06/2020 45.8  36.6 - 50.3 % Final    MCV 12/06/2020 85.0  80.0 - 99.0 FL Final    MCH 12/06/2020 29.5  26.0 - 34.0 PG Final    MCHC 12/06/2020 34.7  30.0 - 36.5 g/dL Final    RDW 12/06/2020 13.4  11.5 - 14.5 % Final    PLATELET 89/31/4735 098  150 - 400 K/uL Final    MPV 12/06/2020 10.0  8.9 - 12.9 FL Final    NEUTROPHILS 12/06/2020 71  32 - 75 % Final    LYMPHOCYTES 12/06/2020 20  12 - 49 % Final    MONOCYTES 12/06/2020 8  5 - 13 % Final    EOSINOPHILS 12/06/2020 1  0 - 7 % Final    BASOPHILS 12/06/2020 0  0 - 1 % Final    IMMATURE GRANULOCYTES 12/06/2020 0  0.0 - 0.5 % Final    ABS. NEUTROPHILS 12/06/2020 9.7* 1.8 - 8.0 K/UL Final    ABS. LYMPHOCYTES 12/06/2020 2.8  0.8 - 3.5 K/UL Final    ABS. MONOCYTES 12/06/2020 1.1* 0.0 - 1.0 K/UL Final    ABS. EOSINOPHILS 12/06/2020 0.1  0.0 - 0.4 K/UL Final    ABS. BASOPHILS 12/06/2020 0.0  0.0 - 0.1 K/UL Final    ABS. IMM. GRANS. 12/06/2020 0.0  0.00 - 0.04 K/UL Final    DF 12/06/2020 AUTOMATED    Final    AMPHETAMINES 12/06/2020 Negative  Negative   Final    BARBITURATES 12/06/2020 Negative  Negative   Final    BENZODIAZEPINES 12/06/2020 Negative  Negative   Final    COCAINE 12/06/2020 Positive* Negative   Final    METHADONE 12/06/2020 Negative  Negative   Final    OPIATES 12/06/2020 Negative  Negative   Final    PCP(PHENCYCLIDINE) 12/06/2020 Negative  Negative   Final    THC (TH-CANNABINOL) 12/06/2020 Negative  Negative   Final    Drug screen comment 12/06/2020 This test is a screen for drugs of abuse in a medical setting only (i.e., they are unconfirmed results and as such must not be used for non-medical purposes, e.g.,employment testing, legal testing). Due to its inherent nature, false positive (FP) and false negative (FN) results may be obtained.  Therefore, if necessary for medical care, recommend confirmation of positive findings by GC/MS. Final    SARS-CoV-2 12/06/2020 Please find results under separate order    Final    Specimen source 12/06/2020 Nasopharyngeal    Final    SARS-CoV-2 12/06/2020 Not detected  Not detected Final    WBC 12/09/2020 7.5  4.1 - 11.1 K/uL Final    RBC 12/09/2020 5.24  4.10 - 5.70 M/uL Final    HGB 12/09/2020 15.0  12.1 - 17.0 g/dL Final    HCT 12/09/2020 44.2  36.6 - 50.3 % Final    MCV 12/09/2020 84.4  80.0 - 99.0 FL Final    MCH 12/09/2020 28.6  26.0 - 34.0 PG Final    MCHC 12/09/2020 33.9  30.0 - 36.5 g/dL Final    RDW 12/09/2020 13.1  11.5 - 14.5 % Final    PLATELET 64/52/6888 308  150 - 400 K/uL Final    MPV 12/09/2020 10.1  8.9 - 12.9 FL Final    NEUTROPHILS 12/09/2020 64  32 - 75 % Final    LYMPHOCYTES 12/09/2020 25  12 - 49 % Final    MONOCYTES 12/09/2020 8  5 - 13 % Final    EOSINOPHILS 12/09/2020 3  0 - 7 % Final    BASOPHILS 12/09/2020 0  0 - 1 % Final    IMMATURE GRANULOCYTES 12/09/2020 0  0.0 - 0.5 % Final    ABS. NEUTROPHILS 12/09/2020 4.8  1.8 - 8.0 K/UL Final    ABS. LYMPHOCYTES 12/09/2020 1.9  0.8 - 3.5 K/UL Final    ABS. MONOCYTES 12/09/2020 0.6  0.0 - 1.0 K/UL Final    ABS. EOSINOPHILS 12/09/2020 0.2  0.0 - 0.4 K/UL Final    ABS. BASOPHILS 12/09/2020 0.0  0.0 - 0.1 K/UL Final    ABS. IMM. GRANS.  12/09/2020 0.0  0.00 - 0.04 K/UL Final    DF 12/09/2020 AUTOMATED    Final    Sodium 12/09/2020 138  136 - 145 mmol/L Final    Potassium 12/09/2020 4.1  3.5 - 5.1 mmol/L Final    Chloride 12/09/2020 105  97 - 108 mmol/L Final    CO2 12/09/2020 30  21 - 32 mmol/L Final    Anion gap 12/09/2020 3* 5 - 15 mmol/L Final    Glucose 12/09/2020 86  65 - 100 mg/dL Final    BUN 12/09/2020 26* 6 - 20 mg/dL Final    Creatinine 12/09/2020 1.18  0.70 - 1.30 mg/dL Final    BUN/Creatinine ratio 12/09/2020 22* 12 - 20   Final    GFR est AA 12/09/2020 >60  >60 ml/min/1.73m2 Final    GFR est non-AA 12/09/2020 >60  >60 ml/min/1.73m2 Final  Calcium 12/09/2020 8.9  8.5 - 10.1 mg/dL Final    Bilirubin, total 12/09/2020 0.6  0.2 - 1.0 mg/dL Final    AST (SGOT) 12/09/2020 10* 15 - 37 U/L Final    ALT (SGPT) 12/09/2020 19  12 - 78 U/L Final    Alk. phosphatase 12/09/2020 67  45 - 117 U/L Final    Protein, total 12/09/2020 7.6  6.4 - 8.2 g/dL Final    Albumin 12/09/2020 3.6  3.5 - 5.0 g/dL Final    Globulin 12/09/2020 4.0  2.0 - 4.0 g/dL Final    A-G Ratio 12/09/2020 0.9* 1.1 - 2.2   Final     No results found. DISPOSITION:    Home. Patient to f/u with drug/etoh rehabilitation, psychiatric and psychotherapy appointments. FOLLOW-UP CARE:    Activity as tolerated  Regular Diet  Wound Care: none needed. Follow-up Information     Follow up With Specialties Details Why Contact Info    None    None ([phone]) Patient stated that they have no PCP                   PROGNOSIS:    Fair ---- based on nature of patient's pathology/ies and treatment compliance issues. Prognosis is greatly dependent upon patient's ability to remain sober and to follow up with drug/etoh rehabilitation and psychiatric/psychotherapy appointments as well as to comply with psychiatric medications as prescribed.             DISCHARGE MEDICATIONS:    Informed consent given for the use of following psychotropic medications:  Current Facility-Administered Medications:     [START ON 12/12/2020] DULoxetine (CYMBALTA) capsule 60 mg, 60 mg, Oral, DAILY, Bijal Park MD    divalproex DR (DEPAKOTE) tablet 500 mg, 500 mg, Oral, BID, Bijal Park MD, 500 mg at 12/11/20 0853    pantoprazole (PROTONIX) tablet 40 mg, 40 mg, Oral, ACB, Bijal Park MD, 40 mg at 12/11/20 4274    nicotine (NICODERM CQ) 14 mg/24 hr patch 1 Patch, 1 Patch, TransDERmal, DAILY, Bijal Park MD, 1 Patch at 12/11/20 0854    influenza vaccine 2020-21 (4 yrs+)(PF) (FLUCELVAX QUAD) injection 0.5 mL, 0.5 mL, IntraMUSCular, PRIOR TO DISCHARGE, Bijal Park MD    melatonin tablet 3 mg, 3 mg, Oral, QHS, Betzaida Frias MD, 3 mg at 12/10/20 2123    chlordiazePOXIDE (LIBRIUM) capsule 10 mg, 10 mg, Oral, TID PRN, Betzaida Frias MD, 10 mg at 12/08/20 2138                 Signed:  Marlen Reyes MD  1/4/2021

## 2022-03-20 PROBLEM — F32.9 MDD (MAJOR DEPRESSIVE DISORDER): Status: ACTIVE | Noted: 2020-12-06

## 2023-12-04 PROCEDURE — 99285 EMERGENCY DEPT VISIT HI MDM: CPT

## 2023-12-05 ENCOUNTER — HOSPITAL ENCOUNTER (INPATIENT)
Facility: HOSPITAL | Age: 43
LOS: 3 days | Discharge: HOME | End: 2023-12-08
Attending: STUDENT IN AN ORGANIZED HEALTH CARE EDUCATION/TRAINING PROGRAM | Admitting: PSYCHIATRY & NEUROLOGY

## 2023-12-05 DIAGNOSIS — R45.851 SUICIDAL IDEATION: Primary | ICD-10-CM

## 2023-12-05 PROBLEM — F31.9 BIPOLAR DISORDER (HCC): Status: ACTIVE | Noted: 2023-12-05

## 2023-12-05 PROBLEM — F33.0 MDD (MAJOR DEPRESSIVE DISORDER), RECURRENT EPISODE, MILD (HCC): Status: ACTIVE | Noted: 2023-12-05

## 2023-12-05 LAB
ALBUMIN SERPL-MCNC: 3.8 G/DL (ref 3.5–5)
ALBUMIN/GLOB SERPL: 0.8 (ref 1.1–2.2)
ALP SERPL-CCNC: 77 U/L (ref 45–117)
ALT SERPL-CCNC: 33 U/L (ref 12–78)
AMPHET UR QL SCN: NEGATIVE
ANION GAP SERPL CALC-SCNC: 9 MMOL/L (ref 5–15)
APPEARANCE UR: CLEAR
AST SERPL W P-5'-P-CCNC: 16 U/L (ref 15–37)
BACTERIA URNS QL MICRO: NEGATIVE /HPF
BARBITURATES UR QL SCN: NEGATIVE
BASOPHILS # BLD: 0.1 K/UL (ref 0–0.1)
BASOPHILS NFR BLD: 0 % (ref 0–1)
BENZODIAZ UR QL: NEGATIVE
BILIRUB SERPL-MCNC: 0.9 MG/DL (ref 0.2–1)
BILIRUB UR QL: NEGATIVE
BUN SERPL-MCNC: 14 MG/DL (ref 6–20)
BUN/CREAT SERPL: 12 (ref 12–20)
CA-I BLD-MCNC: 9.1 MG/DL (ref 8.5–10.1)
CANNABINOIDS UR QL SCN: NEGATIVE
CHLORIDE SERPL-SCNC: 102 MMOL/L (ref 97–108)
CO2 SERPL-SCNC: 25 MMOL/L (ref 21–32)
COCAINE UR QL SCN: POSITIVE
COLOR UR: NORMAL
CREAT SERPL-MCNC: 1.17 MG/DL (ref 0.7–1.3)
DIFFERENTIAL METHOD BLD: ABNORMAL
EOSINOPHIL # BLD: 0 K/UL (ref 0–0.4)
EOSINOPHIL NFR BLD: 0 % (ref 0–7)
EPITH CASTS URNS QL MICRO: NORMAL /LPF
ERYTHROCYTE [DISTWIDTH] IN BLOOD BY AUTOMATED COUNT: 13.3 % (ref 11.5–14.5)
ETHANOL SERPL-MCNC: <10 MG/DL (ref 0–0.08)
FLUAV RNA SPEC QL NAA+PROBE: NOT DETECTED
FLUBV RNA SPEC QL NAA+PROBE: NOT DETECTED
GLOBULIN SER CALC-MCNC: 4.5 G/DL (ref 2–4)
GLUCOSE SERPL-MCNC: 92 MG/DL (ref 65–100)
GLUCOSE UR STRIP.AUTO-MCNC: NEGATIVE MG/DL
HCT VFR BLD AUTO: 42.5 % (ref 36.6–50.3)
HGB BLD-MCNC: 14.8 G/DL (ref 12.1–17)
HGB UR QL STRIP: NEGATIVE
IMM GRANULOCYTES # BLD AUTO: 0.1 K/UL (ref 0–0.04)
IMM GRANULOCYTES NFR BLD AUTO: 1 % (ref 0–0.5)
KETONES UR QL STRIP.AUTO: NEGATIVE MG/DL
LEUKOCYTE ESTERASE UR QL STRIP.AUTO: NEGATIVE
LYMPHOCYTES # BLD: 2.7 K/UL (ref 0.8–3.5)
LYMPHOCYTES NFR BLD: 17 % (ref 12–49)
Lab: ABNORMAL
MCH RBC QN AUTO: 27.4 PG (ref 26–34)
MCHC RBC AUTO-ENTMCNC: 34.8 G/DL (ref 30–36.5)
MCV RBC AUTO: 78.6 FL (ref 80–99)
METHADONE UR QL: NEGATIVE
MONOCYTES # BLD: 1.3 K/UL (ref 0–1)
MONOCYTES NFR BLD: 8 % (ref 5–13)
MUCOUS THREADS URNS QL MICRO: NORMAL /LPF
NEUTS SEG # BLD: 11.8 K/UL (ref 1.8–8)
NEUTS SEG NFR BLD: 74 % (ref 32–75)
NITRITE UR QL STRIP.AUTO: NEGATIVE
NRBC # BLD: 0 K/UL (ref 0–0.01)
NRBC BLD-RTO: 0 PER 100 WBC
OPIATES UR QL: NEGATIVE
PCP UR QL: NEGATIVE
PH UR STRIP: 5 (ref 5–8)
PLATELET # BLD AUTO: 331 K/UL (ref 150–400)
PMV BLD AUTO: 9.8 FL (ref 8.9–12.9)
POTASSIUM SERPL-SCNC: 3.3 MMOL/L (ref 3.5–5.1)
PROT SERPL-MCNC: 8.3 G/DL (ref 6.4–8.2)
PROT UR STRIP-MCNC: NEGATIVE MG/DL
RBC # BLD AUTO: 5.41 M/UL (ref 4.1–5.7)
RBC #/AREA URNS HPF: NORMAL /HPF (ref 0–5)
SARS-COV-2 RNA RESP QL NAA+PROBE: NOT DETECTED
SODIUM SERPL-SCNC: 136 MMOL/L (ref 136–145)
SP GR UR REFRACTOMETRY: 1.01 (ref 1–1.03)
TROPONIN I SERPL HS-MCNC: 15 NG/L (ref 0–76)
TROPONIN I SERPL HS-MCNC: 19 NG/L (ref 0–76)
URINE CULTURE IF INDICATED: NORMAL
UROBILINOGEN UR QL STRIP.AUTO: 0.1 EU/DL (ref 0.1–1)
WBC # BLD AUTO: 16 K/UL (ref 4.1–11.1)
WBC URNS QL MICRO: NORMAL /HPF (ref 0–4)

## 2023-12-05 PROCEDURE — 6370000000 HC RX 637 (ALT 250 FOR IP): Performed by: PSYCHIATRY & NEUROLOGY

## 2023-12-05 PROCEDURE — 82077 ASSAY SPEC XCP UR&BREATH IA: CPT

## 2023-12-05 PROCEDURE — 81001 URINALYSIS AUTO W/SCOPE: CPT

## 2023-12-05 PROCEDURE — 6370000000 HC RX 637 (ALT 250 FOR IP): Performed by: STUDENT IN AN ORGANIZED HEALTH CARE EDUCATION/TRAINING PROGRAM

## 2023-12-05 PROCEDURE — 80053 COMPREHEN METABOLIC PANEL: CPT

## 2023-12-05 PROCEDURE — 87636 SARSCOV2 & INF A&B AMP PRB: CPT

## 2023-12-05 PROCEDURE — 80307 DRUG TEST PRSMV CHEM ANLYZR: CPT

## 2023-12-05 PROCEDURE — 93005 ELECTROCARDIOGRAM TRACING: CPT | Performed by: STUDENT IN AN ORGANIZED HEALTH CARE EDUCATION/TRAINING PROGRAM

## 2023-12-05 PROCEDURE — 1240000000 HC EMOTIONAL WELLNESS R&B

## 2023-12-05 PROCEDURE — 36415 COLL VENOUS BLD VENIPUNCTURE: CPT

## 2023-12-05 PROCEDURE — 84484 ASSAY OF TROPONIN QUANT: CPT

## 2023-12-05 PROCEDURE — 85025 COMPLETE CBC W/AUTO DIFF WBC: CPT

## 2023-12-05 RX ORDER — DULOXETIN HYDROCHLORIDE 60 MG/1
60 CAPSULE, DELAYED RELEASE ORAL DAILY
Status: ON HOLD | COMMUNITY
End: 2023-12-08 | Stop reason: HOSPADM

## 2023-12-05 RX ORDER — MAGNESIUM HYDROXIDE/ALUMINUM HYDROXICE/SIMETHICONE 120; 1200; 1200 MG/30ML; MG/30ML; MG/30ML
30 SUSPENSION ORAL EVERY 6 HOURS PRN
Status: DISCONTINUED | OUTPATIENT
Start: 2023-12-05 | End: 2023-12-08 | Stop reason: HOSPADM

## 2023-12-05 RX ORDER — HYDROXYZINE 50 MG/1
50 TABLET, FILM COATED ORAL 3 TIMES DAILY PRN
Status: DISCONTINUED | OUTPATIENT
Start: 2023-12-05 | End: 2023-12-08 | Stop reason: HOSPADM

## 2023-12-05 RX ORDER — DULOXETIN HYDROCHLORIDE 30 MG/1
60 CAPSULE, DELAYED RELEASE ORAL DAILY
Status: DISCONTINUED | OUTPATIENT
Start: 2023-12-06 | End: 2023-12-06

## 2023-12-05 RX ORDER — OMEPRAZOLE 20 MG/1
40 CAPSULE, DELAYED RELEASE ORAL DAILY
Status: ON HOLD | COMMUNITY
End: 2023-12-08 | Stop reason: HOSPADM

## 2023-12-05 RX ORDER — HYDROXYZINE PAMOATE 50 MG/1
50 CAPSULE ORAL 3 TIMES DAILY PRN
COMMUNITY

## 2023-12-05 RX ORDER — ACETAMINOPHEN 325 MG/1
650 TABLET ORAL EVERY 4 HOURS PRN
Status: DISCONTINUED | OUTPATIENT
Start: 2023-12-05 | End: 2023-12-08 | Stop reason: HOSPADM

## 2023-12-05 RX ORDER — FAMOTIDINE 20 MG/1
20 TABLET, FILM COATED ORAL ONCE
Status: COMPLETED | OUTPATIENT
Start: 2023-12-05 | End: 2023-12-05

## 2023-12-05 RX ORDER — ASPIRIN 81 MG/1
81 TABLET, CHEWABLE ORAL DAILY
Status: DISCONTINUED | OUTPATIENT
Start: 2023-12-06 | End: 2023-12-08 | Stop reason: HOSPADM

## 2023-12-05 RX ORDER — NICOTINE 21 MG/24HR
1 PATCH, TRANSDERMAL 24 HOURS TRANSDERMAL DAILY
Status: DISCONTINUED | OUTPATIENT
Start: 2023-12-05 | End: 2023-12-08 | Stop reason: HOSPADM

## 2023-12-05 RX ORDER — MAGNESIUM HYDROXIDE/ALUMINUM HYDROXICE/SIMETHICONE 120; 1200; 1200 MG/30ML; MG/30ML; MG/30ML
30 SUSPENSION ORAL ONCE
Status: DISCONTINUED | OUTPATIENT
Start: 2023-12-05 | End: 2023-12-08 | Stop reason: HOSPADM

## 2023-12-05 RX ORDER — ASPIRIN 81 MG/1
81 TABLET, CHEWABLE ORAL DAILY
COMMUNITY

## 2023-12-05 RX ORDER — PANTOPRAZOLE SODIUM 40 MG/1
40 TABLET, DELAYED RELEASE ORAL
Status: DISCONTINUED | OUTPATIENT
Start: 2023-12-06 | End: 2023-12-08 | Stop reason: HOSPADM

## 2023-12-05 RX ORDER — LIDOCAINE HYDROCHLORIDE 20 MG/ML
15 SOLUTION OROPHARYNGEAL
Status: ACTIVE | OUTPATIENT
Start: 2023-12-05 | End: 2023-12-05

## 2023-12-05 RX ORDER — TRAZODONE HYDROCHLORIDE 50 MG/1
50 TABLET ORAL NIGHTLY PRN
Status: DISCONTINUED | OUTPATIENT
Start: 2023-12-05 | End: 2023-12-07

## 2023-12-05 RX ADMIN — HYDROXYZINE HYDROCHLORIDE 50 MG: 50 TABLET, FILM COATED ORAL at 20:44

## 2023-12-05 RX ADMIN — FAMOTIDINE 20 MG: 20 TABLET ORAL at 20:44

## 2023-12-05 ASSESSMENT — LIFESTYLE VARIABLES
HOW MANY STANDARD DRINKS CONTAINING ALCOHOL DO YOU HAVE ON A TYPICAL DAY: 10 OR MORE
HOW OFTEN DO YOU HAVE A DRINK CONTAINING ALCOHOL: 4 OR MORE TIMES A WEEK

## 2023-12-05 ASSESSMENT — PAIN - FUNCTIONAL ASSESSMENT
PAIN_FUNCTIONAL_ASSESSMENT: 0-10
PAIN_FUNCTIONAL_ASSESSMENT: NONE - DENIES PAIN

## 2023-12-05 ASSESSMENT — SLEEP AND FATIGUE QUESTIONNAIRES
DO YOU HAVE DIFFICULTY SLEEPING: YES
DO YOU USE A SLEEP AID: NO
SLEEP PATTERN: DIFFICULTY FALLING ASLEEP;INSOMNIA;RESTLESSNESS
AVERAGE NUMBER OF SLEEP HOURS: 2

## 2023-12-05 ASSESSMENT — PAIN SCALES - GENERAL: PAINLEVEL_OUTOF10: 0
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
## 2023-12-05 NOTE — GROUP NOTE
Group Therapy Note    Date: 12/5/2023    Group Start Time: 1100  Group End Time: 1130  Group Topic: Education Group - Inpatient    [facility] 2 [facility] ACUTE    Shade Dyer        Group Therapy Note:This writer provided each individual with an educational handout on the emotion of anger and positive ways to cope with it. Attendees: 8       Patient's Goal:  to attend group    Notes:  Pt was going through the admission process during the time.       Signature:  Travis

## 2023-12-05 NOTE — ED NOTES
Pt wanded prior to being transported to , all pt belongings (bag of clothes) sent with pt.      Marlon Weston RN  12/05/23 2399
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
## 2023-12-06 LAB
EKG ATRIAL RATE: 109 BPM
EKG DIAGNOSIS: NORMAL
EKG P AXIS: 61 DEGREES
EKG P-R INTERVAL: 138 MS
EKG Q-T INTERVAL: 374 MS
EKG QRS DURATION: 84 MS
EKG QTC CALCULATION (BAZETT): 503 MS
EKG R AXIS: -4 DEGREES
EKG T AXIS: 44 DEGREES
EKG VENTRICULAR RATE: 109 BPM

## 2023-12-06 PROCEDURE — 6370000000 HC RX 637 (ALT 250 FOR IP): Performed by: INTERNAL MEDICINE

## 2023-12-06 PROCEDURE — 6370000000 HC RX 637 (ALT 250 FOR IP): Performed by: PSYCHIATRY & NEUROLOGY

## 2023-12-06 PROCEDURE — 1240000000 HC EMOTIONAL WELLNESS R&B

## 2023-12-06 RX ORDER — DULOXETIN HYDROCHLORIDE 30 MG/1
90 CAPSULE, DELAYED RELEASE ORAL DAILY
Status: DISCONTINUED | OUTPATIENT
Start: 2023-12-07 | End: 2023-12-08 | Stop reason: HOSPADM

## 2023-12-06 RX ORDER — POTASSIUM CHLORIDE 20 MEQ/1
40 TABLET, EXTENDED RELEASE ORAL ONCE
Status: COMPLETED | OUTPATIENT
Start: 2023-12-06 | End: 2023-12-06

## 2023-12-06 RX ADMIN — PANTOPRAZOLE SODIUM 40 MG: 40 TABLET, DELAYED RELEASE ORAL at 06:07

## 2023-12-06 RX ADMIN — HYDROXYZINE HYDROCHLORIDE 50 MG: 50 TABLET, FILM COATED ORAL at 06:10

## 2023-12-06 RX ADMIN — BUSPIRONE HYDROCHLORIDE 15 MG: 10 TABLET ORAL at 21:14

## 2023-12-06 RX ADMIN — ASPIRIN 81 MG: 81 TABLET, CHEWABLE ORAL at 08:30

## 2023-12-06 RX ADMIN — DULOXETINE HYDROCHLORIDE 60 MG: 30 CAPSULE, DELAYED RELEASE ORAL at 08:30

## 2023-12-06 RX ADMIN — POTASSIUM CHLORIDE 40 MEQ: 1500 TABLET, EXTENDED RELEASE ORAL at 21:15

## 2023-12-06 RX ADMIN — BUSPIRONE HYDROCHLORIDE 15 MG: 10 TABLET ORAL at 13:47

## 2023-12-06 NOTE — GROUP NOTE
Group Therapy Note    Date: 12/5/2023    Group Start Time: 1845  Group End Time: 1940  Group Topic: Recreational    [facility] [address] [facility]        Group Therapy Note    Attendees: 3/6    Recreational Therapist facilitated structured leisure skills group to introduce healthy leisure skills as positive way to cope and manage mood. Notes:  Did not attend group despite encouragement      Discipline Responsible: Recreational Therapist      Signature:   FINN Howe

## 2023-12-06 NOTE — GROUP NOTE
Group Therapy Note    Date: 12/6/2023    Group Start Time: 1115  Group End Time: 1200  Group Topic: Process Group - Inpatient    [facility] ACUTE    AVRIL Mclean        Group Therapy Note: Facilitator engaged the group in therapeutic discussion about \"The Four Agreements\" a guide to personal freedom, to reduce self judgement, self abuse and needless suffering. Attendees: 5       Patient's Goal:  To attend groups    Notes:  Pt openly shared and offered positive feedback and insight to peers. Status After Intervention:  Improved    Participation Level:  Active Listener and Interactive    Participation Quality: Appropriate, Attentive, and Sharing      Speech:  normal      Thought Process/Content: Logical      Affective Functioning: Congruent      Mood: euthymic      Level of consciousness:  Alert, Oriented x4, and Attentive      Response to Learning: Able to verbalize current knowledge/experience, Able to verbalize/acknowledge new learning, Able to retain information, Capable of insight, Able to change behavior, and Progressing to goal      Endings: None Reported    Modes of Intervention: Education, Support, and Socialization      Discipline Responsible: /Counselor      Signature:  AVRIL Mclean

## 2023-12-07 LAB
ANION GAP SERPL CALC-SCNC: 3 MMOL/L (ref 5–15)
BASOPHILS # BLD: 0.1 K/UL (ref 0–0.1)
BASOPHILS NFR BLD: 1 % (ref 0–1)
BUN SERPL-MCNC: 18 MG/DL (ref 6–20)
BUN/CREAT SERPL: 16 (ref 12–20)
CA-I BLD-MCNC: 9.6 MG/DL (ref 8.5–10.1)
CHLORIDE SERPL-SCNC: 108 MMOL/L (ref 97–108)
CO2 SERPL-SCNC: 27 MMOL/L (ref 21–32)
CREAT SERPL-MCNC: 1.16 MG/DL (ref 0.7–1.3)
DIFFERENTIAL METHOD BLD: NORMAL
EOSINOPHIL # BLD: 0.2 K/UL (ref 0–0.4)
EOSINOPHIL NFR BLD: 2 % (ref 0–7)
ERYTHROCYTE [DISTWIDTH] IN BLOOD BY AUTOMATED COUNT: 13.3 % (ref 11.5–14.5)
GLUCOSE SERPL-MCNC: 81 MG/DL (ref 65–100)
HCT VFR BLD AUTO: 44.8 % (ref 36.6–50.3)
HGB BLD-MCNC: 15.3 G/DL (ref 12.1–17)
IMM GRANULOCYTES # BLD AUTO: 0 K/UL (ref 0–0.04)
IMM GRANULOCYTES NFR BLD AUTO: 0 % (ref 0–0.5)
LYMPHOCYTES # BLD: 1.8 K/UL (ref 0.8–3.5)
LYMPHOCYTES NFR BLD: 22 % (ref 12–49)
MCH RBC QN AUTO: 27.6 PG (ref 26–34)
MCHC RBC AUTO-ENTMCNC: 34.2 G/DL (ref 30–36.5)
MCV RBC AUTO: 80.7 FL (ref 80–99)
MONOCYTES # BLD: 0.8 K/UL (ref 0–1)
MONOCYTES NFR BLD: 9 % (ref 5–13)
NEUTS SEG # BLD: 5.5 K/UL (ref 1.8–8)
NEUTS SEG NFR BLD: 66 % (ref 32–75)
NRBC # BLD: 0 K/UL (ref 0–0.01)
NRBC BLD-RTO: 0 PER 100 WBC
PLATELET # BLD AUTO: 335 K/UL (ref 150–400)
PMV BLD AUTO: 10 FL (ref 8.9–12.9)
POTASSIUM SERPL-SCNC: 4.2 MMOL/L (ref 3.5–5.1)
RBC # BLD AUTO: 5.55 M/UL (ref 4.1–5.7)
SODIUM SERPL-SCNC: 138 MMOL/L (ref 136–145)
WBC # BLD AUTO: 8.4 K/UL (ref 4.1–11.1)

## 2023-12-07 PROCEDURE — 80048 BASIC METABOLIC PNL TOTAL CA: CPT

## 2023-12-07 PROCEDURE — 6370000000 HC RX 637 (ALT 250 FOR IP): Performed by: PSYCHIATRY & NEUROLOGY

## 2023-12-07 PROCEDURE — 1240000000 HC EMOTIONAL WELLNESS R&B

## 2023-12-07 PROCEDURE — 85025 COMPLETE CBC W/AUTO DIFF WBC: CPT

## 2023-12-07 PROCEDURE — 36415 COLL VENOUS BLD VENIPUNCTURE: CPT

## 2023-12-07 RX ORDER — CHOLECALCIFEROL (VITAMIN D3) 125 MCG
5 CAPSULE ORAL NIGHTLY PRN
Status: DISCONTINUED | OUTPATIENT
Start: 2023-12-07 | End: 2023-12-08 | Stop reason: HOSPADM

## 2023-12-07 RX ADMIN — ASPIRIN 81 MG: 81 TABLET, CHEWABLE ORAL at 08:56

## 2023-12-07 RX ADMIN — DULOXETINE HYDROCHLORIDE 90 MG: 30 CAPSULE, DELAYED RELEASE ORAL at 08:56

## 2023-12-07 RX ADMIN — BUSPIRONE HYDROCHLORIDE 15 MG: 10 TABLET ORAL at 20:49

## 2023-12-07 RX ADMIN — BUSPIRONE HYDROCHLORIDE 15 MG: 10 TABLET ORAL at 08:56

## 2023-12-07 RX ADMIN — PANTOPRAZOLE SODIUM 40 MG: 40 TABLET, DELAYED RELEASE ORAL at 06:57

## 2023-12-07 RX ADMIN — Medication 5 MG: at 20:49

## 2023-12-07 NOTE — CONSULTS
Consult Date: 12/6/2023    Chief Complaint: No medical complaints  Chief Complaint   Patient presents with    Mental Health Problem      HPI: HPI patient is a 53-year-old -American man who has been admitted here for psychiatric admission treatment he denies any history of coffee or chills that I see him nausea vomiting diarrhea or abdominal pain or black stools and no history of increased frequency of micturition or painful micturition no history of any joint pain or joint swelling no history of headache or dizziness no history of loss of consciousness or seizures.   Change in appetite or change in weight no history of fever and nasal discharge and also have throat pain or earache no s history of change in vision  ROS:Review of Systems     Constitutional: Negative  HEENT: Negative  CVS: Negative history of myocardial infarction x 2 in the past  RS: Negative  GI: Negative  : Negative  Musculoskeletal: Negative  Immunology: Records are not available  Neurology: Negative  Endocrine: Negative  Haem-Onc: Negative  Skin: Negative  Psychiatry: Depression  Allergies  No Known Allergies  FAMILY HISTORY:  Family History   Problem Relation Age of Onset    Cancer Father     Cancer Maternal Grandfather    Mother has history of hypertension and diabetes  SOCIAL HISTORY:  Social History     Socioeconomic History    Marital status: Single     Spouse name: Not on file    Number of children: Not on file    Years of education: Not on file    Highest education level: Not on file   Occupational History    Not on file   Tobacco Use    Smoking status: Every Day     Packs/day: 1     Types: Cigarettes    Smokeless tobacco: Never   Substance and Sexual Activity    Alcohol use: Yes     Comment: beer and liqour 15+ daily    Drug use: Yes     Frequency: 3.0 times per week     Types: Cocaine    Sexual activity: Not on file   Other Topics Concern    Not on file   Social History Narrative    Not on file     Social Determinants of 3  HEENT: Normocephalic atraumatic skull pupils are bilaterally equally reacting to light sclerae are nonicteric conjunctivae pink no ER or nasal discharge discharge tongue is pink a no ulcer positive gag reflex no pharyngeal inflammation external ocular movements and  Neck: Supple with full range of motion and no JVD noted no lymphadenopathy and no thyromegaly and no carotid bruit  Chest: Expands no clear breath sounds no rhonchi and no rales ocalized swelling or tenderness  RS:  CVS S1 and S2 regular regular no murmur or gallop CNS: Grossly unremarkable abdomen-obese no organomegaly and no tenderness        LABS:    No results found. Recent Results (from the past 168 hour(s))   EKG 12 Lead    Collection Time: 12/05/23 12:47 AM   Result Value Ref Range    Ventricular Rate 109 BPM    Atrial Rate 109 BPM    P-R Interval 138 ms    QRS Duration 84 ms    Q-T Interval 374 ms    QTc Calculation (Bazett) 503 ms    P Axis 61 degrees    R Axis -4 degrees    T Axis 44 degrees    Diagnosis       Sinus tachycardia  Otherwise normal ECG  When compared with ECG of 16-JUN-2018 22:40,  Vent.  rate has increased BY  39 BPM  ST now depressed in Inferior leads  QT has lengthened  Confirmed by MATHEUS Ku ([id number]) on 12/6/2023 6:10:43 AM     COVID-19 & Influenza Combo    Collection Time: 12/05/23  1:32 AM    Specimen: Nasopharyngeal   Result Value Ref Range    SARS-CoV-2, PCR Not Detected Not Detected      Rapid Influenza A By PCR Not Detected Not Detected      Rapid Influenza B By PCR Not Detected Not Detected     CBC with Auto Differential    Collection Time: 12/05/23  1:32 AM   Result Value Ref Range    WBC 16.0 (H) 4.1 - 11.1 K/uL    RBC 5.41 4.10 - 5.70 M/uL    Hemoglobin 14.8 12.1 - 17.0 g/dL    Hematocrit 42.5 36.6 - 50.3 %    MCV 78.6 (L) 80.0 - 99.0 FL    MCH 27.4 26.0 - 34.0 PG    MCHC 34.8 30.0 - 36.5 g/dL    RDW 13.3 11.5 - 14.5 %    Platelets 985 744 - 485 K/uL    MPV 9.8 8.9 - 12.9 FL    Nucleated RBCs 0.0 0.0

## 2023-12-07 NOTE — GROUP NOTE
Group Therapy Note    Date: 12/7/2023    Group Start Time: 1330  Group End Time: 3586  Group Topic: Recreational    [facility] ACUTE    Ashwini Villaseñor        Group Therapy Note    Facilitated leisure skills group to reinforce positive coping and to manage mood through music, social interaction, group activities and art task       Attendees: 5/6       Patient's Goal:  Attend group daily     Notes:  Pt was receptive to listening to music and songs he selected while working on leisure task. Interacted with peers and staff     Status After Intervention:  Improved    Participation Level:  Active Listener and Interactive    Participation Quality: Appropriate and Attentive      Speech:  normal      Thought Process/Content: Logical      Affective Functioning: Congruent      Mood:  Calm      Level of consciousness:  Attentive      Response to Learning: Progressing to goal      Endings: None Reported    Modes of Intervention: Socialization and Activity      Discipline Responsible: Recreational Therapist      Signature:  Juan Diego Ferguson

## 2023-12-07 NOTE — GROUP NOTE
Group Therapy Note    Date: 12/6/2023    Group Start Time: 1845  Group End Time: 1940  Group Topic: Recreational    [facility] 2 [address] [place] [address] Avenue Therapy Note    Attendees: 4/6    Recreational Therapist facilitated structured leisure skills group to introduce healthy leisure skills as positive way to cope and manage mood. Notes:  Did not attend group despite encouragement    Discipline Responsible: Recreational Therapist      Signature:   FINN Thorpe

## 2023-12-07 NOTE — GROUP NOTE
Group Therapy Note    Date: 12/7/2023    Group Start Time: 1100  Group End Time: 1130  Group Topic: Process Group - Inpatient    [facility] ACUTE    Meg Dyerabby        Group Therapy Note:This writer facilitated a group where the emotion of anger was discussed including triggers, positive coping skills and the \"anger ball' game was played to process the emotion. Attendees: 4       Patient's Goal:  to attend groups. Notes:  Pt introduced self and stated his mother and sister make him anger, pt did not expand any further. Pt stated \"talking to someone about the situation and thinking things out\" helps him cope with anger in a positive manner. Status After Intervention:  Improved    Participation Level:  Active Listener and Interactive    Participation Quality: Appropriate, Attentive, Sharing, and Supportive      Speech:  normal      Thought Process/Content: Logical  Linear      Affective Functioning: Congruent      Mood: calm      Level of consciousness:  Alert, Oriented x4, and Attentive      Response to Learning: Able to verbalize current knowledge/experience, Able to verbalize/acknowledge new learning, Able to retain information, and Capable of insight      Endings: None Reported    Modes of Intervention: Education, Support, and Socialization      Discipline Responsible: /Counselor      Signature:  Angelica Ho

## 2023-12-08 VITALS
HEIGHT: 69 IN | TEMPERATURE: 97.8 F | OXYGEN SATURATION: 97 % | DIASTOLIC BLOOD PRESSURE: 91 MMHG | HEART RATE: 76 BPM | WEIGHT: 180 LBS | SYSTOLIC BLOOD PRESSURE: 144 MMHG | BODY MASS INDEX: 26.66 KG/M2 | RESPIRATION RATE: 16 BRPM

## 2023-12-08 PROCEDURE — 6370000000 HC RX 637 (ALT 250 FOR IP): Performed by: PSYCHIATRY & NEUROLOGY

## 2023-12-08 RX ORDER — DULOXETIN HYDROCHLORIDE 30 MG/1
90 CAPSULE, DELAYED RELEASE ORAL DAILY
Qty: 30 CAPSULE | Refills: 1 | Status: SHIPPED | OUTPATIENT
Start: 2023-12-09

## 2023-12-08 RX ORDER — CHOLECALCIFEROL (VITAMIN D3) 125 MCG
5 CAPSULE ORAL NIGHTLY
Qty: 30 TABLET | Refills: 1 | Status: SHIPPED | OUTPATIENT
Start: 2023-12-08

## 2023-12-08 RX ORDER — PANTOPRAZOLE SODIUM 40 MG/1
40 TABLET, DELAYED RELEASE ORAL
Qty: 30 TABLET | Refills: 1 | Status: SHIPPED | OUTPATIENT
Start: 2023-12-09

## 2023-12-08 RX ORDER — BUSPIRONE HYDROCHLORIDE 15 MG/1
15 TABLET ORAL 2 TIMES DAILY
Qty: 60 TABLET | Refills: 1 | Status: SHIPPED | OUTPATIENT
Start: 2023-12-08

## 2023-12-08 RX ADMIN — PANTOPRAZOLE SODIUM 40 MG: 40 TABLET, DELAYED RELEASE ORAL at 06:27

## 2023-12-08 RX ADMIN — BUSPIRONE HYDROCHLORIDE 15 MG: 10 TABLET ORAL at 08:22

## 2023-12-08 RX ADMIN — DULOXETINE HYDROCHLORIDE 90 MG: 30 CAPSULE, DELAYED RELEASE ORAL at 08:22

## 2023-12-08 RX ADMIN — ASPIRIN 81 MG: 81 TABLET, CHEWABLE ORAL at 08:22

## 2023-12-08 NOTE — GROUP NOTE
Group Therapy Note    Date: 12/8/2023    Group Start Time: 9484  Group End Time: 1020  Group Topic: Education Group - Inpatient    SSR 2  NON ACUTE    Lorna Martin        Group Therapy Note    Facilitated group to introduce the definition of self-esteem and discuss information relating to creating steps to greater self-appreciation and recognizing symptoms of self-defeat       Attendees: 5/7       Patient's Goal: Attend group daily     Notes:  Receptive to information discussed and engaged. Pt was able to recognize symptoms and identified some personal symptoms of self-defeat and was able to share positive ways to his boost self-esteem     Status After Intervention:  Improved    Participation Level:  Active Listener and Interactive    Participation Quality: Appropriate, Attentive, and Sharing      Speech:  normal      Thought Process/Content: Logical      Affective Functioning: Congruent      Mood:  Calm      Level of consciousness:  Attentive      Response to Learning: Able to verbalize current knowledge/experience and Progressing to goal      Endings: None Reported    Modes of Intervention: Education and Support      Discipline Responsible: Recreational Therapist      Signature:  Juan Diego Carey
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
## 2023-12-08 NOTE — GROUP NOTE
Group Therapy Note    Date: 12/8/2023    Group Start Time: 1340  Group End Time: 1430  Group Topic: Recreational    SSR 2 BH NON ACUTE    Leonor Nicholson        Group Therapy Note    Facilitated leisure skills group to reinforce positive coping and to manage mood through music, social interaction, group activities and art task      Attendees: 6/7       Patient's Goal:  Attend group daily     Notes:  Pt was receptive to listening to music and a song he selected. Interacted with peers and staff. Declined to work on leisure task. Left group to prepare for discharge     Status After Intervention:  Improved    Participation Level:  Active Listener and Interactive    Participation Quality: Appropriate      Speech:  normal      Thought Process/Content: Logical      Affective Functioning: Congruent      Mood:  Calm      Level of consciousness:  Alert      Response to Learning: Progressing to goal      Endings: None Reported    Modes of Intervention: Socialization and Activity      Discipline Responsible: Recreational Therapist      Signature:  Juan Diego Nicole

## 2024-06-09 ENCOUNTER — APPOINTMENT (OUTPATIENT)
Facility: HOSPITAL | Age: 44
End: 2024-06-09
Payer: OTHER MISCELLANEOUS

## 2024-06-09 ENCOUNTER — HOSPITAL ENCOUNTER (EMERGENCY)
Facility: HOSPITAL | Age: 44
Discharge: HOME | End: 2024-06-09
Attending: EMERGENCY MEDICINE
Payer: OTHER MISCELLANEOUS

## 2024-06-09 VITALS
HEIGHT: 69 IN | RESPIRATION RATE: 19 BRPM | OXYGEN SATURATION: 98 % | SYSTOLIC BLOOD PRESSURE: 157 MMHG | BODY MASS INDEX: 28.14 KG/M2 | TEMPERATURE: 98.1 F | WEIGHT: 190 LBS | DIASTOLIC BLOOD PRESSURE: 97 MMHG | HEART RATE: 80 BPM

## 2024-06-09 DIAGNOSIS — S39.012A STRAIN OF LUMBAR REGION, INITIAL ENCOUNTER: ICD-10-CM
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
## 2024-06-09 LAB
ALBUMIN SERPL-MCNC: 3.7 G/DL (ref 3.5–5)
ALBUMIN/GLOB SERPL: 0.9 (ref 1.1–2.2)
ALP SERPL-CCNC: 77 U/L (ref 45–117)
ALT SERPL-CCNC: 27 U/L (ref 12–78)
ANION GAP SERPL CALC-SCNC: 6 MMOL/L (ref 5–15)
APPEARANCE UR: CLEAR
AST SERPL W P-5'-P-CCNC: 17 U/L (ref 15–37)
BACTERIA URNS QL MICRO: NEGATIVE /HPF
BILIRUB SERPL-MCNC: 0.3 MG/DL (ref 0.2–1)
BILIRUB UR QL: NEGATIVE
BUN SERPL-MCNC: 18 MG/DL (ref 6–20)
BUN/CREAT SERPL: 17 (ref 12–20)
CA-I BLD-MCNC: 9.2 MG/DL (ref 8.5–10.1)
CHLORIDE SERPL-SCNC: 106 MMOL/L (ref 97–108)
CO2 SERPL-SCNC: 28 MMOL/L (ref 21–32)
COLOR UR: ABNORMAL
CREAT SERPL-MCNC: 1.03 MG/DL (ref 0.7–1.3)
ERYTHROCYTE [DISTWIDTH] IN BLOOD BY AUTOMATED COUNT: 13 % (ref 11.5–14.5)
ETHANOL SERPL-MCNC: 55 MG/DL (ref 0–0.08)
GLOBULIN SER CALC-MCNC: 4 G/DL (ref 2–4)
GLUCOSE SERPL-MCNC: 85 MG/DL (ref 65–100)
GLUCOSE UR STRIP.AUTO-MCNC: NEGATIVE MG/DL
HCT VFR BLD AUTO: 41.6 % (ref 36.6–50.3)
HGB BLD-MCNC: 14.4 G/DL (ref 12.1–17)
HGB UR QL STRIP: NEGATIVE
KETONES UR QL STRIP.AUTO: NEGATIVE MG/DL
LEUKOCYTE ESTERASE UR QL STRIP.AUTO: NEGATIVE
MCH RBC QN AUTO: 28.5 PG (ref 26–34)
MCHC RBC AUTO-ENTMCNC: 34.6 G/DL (ref 30–36.5)
MCV RBC AUTO: 82.2 FL (ref 80–99)
NITRITE UR QL STRIP.AUTO: NEGATIVE
NRBC # BLD: 0 K/UL (ref 0–0.01)
NRBC BLD-RTO: 0 PER 100 WBC
PH UR STRIP: 7 (ref 5–8)
PLATELET # BLD AUTO: 300 K/UL (ref 150–400)
PMV BLD AUTO: 9.5 FL (ref 8.9–12.9)
POTASSIUM SERPL-SCNC: 3.7 MMOL/L (ref 3.5–5.1)
PROT SERPL-MCNC: 7.7 G/DL (ref 6.4–8.2)
PROT UR STRIP-MCNC: NEGATIVE MG/DL
RBC # BLD AUTO: 5.06 M/UL (ref 4.1–5.7)
RBC #/AREA URNS HPF: ABNORMAL /HPF (ref 0–5)
SODIUM SERPL-SCNC: 140 MMOL/L (ref 136–145)
SP GR UR REFRACTOMETRY: >1.03 (ref 1–1.03)
UROBILINOGEN UR QL STRIP.AUTO: 0.1 EU/DL (ref 0.1–1)
WBC # BLD AUTO: 9.8 K/UL (ref 4.1–11.1)
WBC URNS QL MICRO: ABNORMAL /HPF (ref 0–4)

## 2024-06-09 PROCEDURE — 96374 THER/PROPH/DIAG INJ IV PUSH: CPT

## 2024-06-09 PROCEDURE — 72125 CT NECK SPINE W/O DYE: CPT

## 2024-06-09 PROCEDURE — 73562 X-RAY EXAM OF KNEE 3: CPT

## 2024-06-09 PROCEDURE — 99285 EMERGENCY DEPT VISIT HI MDM: CPT

## 2024-06-09 PROCEDURE — 71260 CT THORAX DX C+: CPT

## 2024-06-09 PROCEDURE — 80053 COMPREHEN METABOLIC PANEL: CPT

## 2024-06-09 PROCEDURE — 6360000002 HC RX W HCPCS: Performed by: EMERGENCY MEDICINE

## 2024-06-09 PROCEDURE — 70450 CT HEAD/BRAIN W/O DYE: CPT

## 2024-06-09 PROCEDURE — 6830039000 HC L3 TRAUMA ALERT

## 2024-06-09 PROCEDURE — 36415 COLL VENOUS BLD VENIPUNCTURE: CPT

## 2024-06-09 PROCEDURE — 81001 URINALYSIS AUTO W/SCOPE: CPT

## 2024-06-09 PROCEDURE — 71045 X-RAY EXAM CHEST 1 VIEW: CPT

## 2024-06-09 PROCEDURE — 82077 ASSAY SPEC XCP UR&BREATH IA: CPT

## 2024-06-09 PROCEDURE — 6360000004 HC RX CONTRAST MEDICATION: Performed by: EMERGENCY MEDICINE

## 2024-06-09 PROCEDURE — 73030 X-RAY EXAM OF SHOULDER: CPT

## 2024-06-09 PROCEDURE — 85027 COMPLETE CBC AUTOMATED: CPT

## 2024-06-09 RX ORDER — MORPHINE SULFATE 4 MG/ML
4 INJECTION, SOLUTION INTRAMUSCULAR; INTRAVENOUS
Status: COMPLETED | OUTPATIENT
Start: 2024-06-09 | End: 2024-06-09

## 2024-06-09 RX ORDER — TIZANIDINE 4 MG/1
4 TABLET ORAL EVERY 8 HOURS PRN
Qty: 15 TABLET | Refills: 0 | Status: SHIPPED | OUTPATIENT
Start: 2024-06-09 | End: 2024-06-14

## 2024-06-09 RX ORDER — NAPROXEN 375 MG/1
375 TABLET ORAL 2 TIMES DAILY PRN
Qty: 14 TABLET | Refills: 0 | Status: SHIPPED | OUTPATIENT
Start: 2024-06-09 | End: 2024-06-16

## 2024-06-09 RX ADMIN — IOPAMIDOL 100 ML: 755 INJECTION, SOLUTION INTRAVENOUS at 11:45

## 2024-06-09 RX ADMIN — MORPHINE SULFATE 4 MG: 4 INJECTION, SOLUTION INTRAMUSCULAR; INTRAVENOUS at 11:09

## 2024-06-09 ASSESSMENT — PAIN DESCRIPTION - DESCRIPTORS: DESCRIPTORS: ACHING;THROBBING

## 2024-06-09 ASSESSMENT — PAIN DESCRIPTION - ORIENTATION: ORIENTATION: RIGHT;LEFT

## 2024-06-09 ASSESSMENT — LIFESTYLE VARIABLES
HOW MANY STANDARD DRINKS CONTAINING ALCOHOL DO YOU HAVE ON A TYPICAL DAY: 1 OR 2
HOW OFTEN DO YOU HAVE A DRINK CONTAINING ALCOHOL: MONTHLY OR LESS

## 2024-06-09 ASSESSMENT — PAIN SCALES - GENERAL
PAINLEVEL_OUTOF10: 9
PAINLEVEL_OUTOF10: 3

## 2024-06-09 ASSESSMENT — PAIN DESCRIPTION - LOCATION: LOCATION: NECK;SHOULDER

## 2024-06-09 ASSESSMENT — PAIN - FUNCTIONAL ASSESSMENT: PAIN_FUNCTIONAL_ASSESSMENT: 0-10
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
## 2024-06-09 NOTE — DISCHARGE INSTRUCTIONS
Return to the emergency room if any worsening.  You can take the pain medicine/muscle relaxer as needed.  If you experience any new or worsening symptoms, please return.        Thank you for choosing our Emergency Department for your care.  It is our privilege to care for you in your time of need.  In the next several days, you may receive a survey via email or mailed to your home about your experience with our team.  We would greatly appreciate you taking a few minutes to complete the survey, as we use this information to learn what we have done well and what we could be doing better. Thank you for trusting us with your care!    Below you will find a list of your tests from today's visit.   Labs  Recent Results (from the past 12 hour(s))   CBC    Collection Time: 06/09/24 11:09 AM   Result Value Ref Range    WBC 9.8 4.1 - 11.1 K/uL    RBC 5.06 4.10 - 5.70 M/uL    Hemoglobin 14.4 12.1 - 17.0 g/dL    Hematocrit 41.6 36.6 - 50.3 %    MCV 82.2 80.0 - 99.0 FL    MCH 28.5 26.0 - 34.0 PG    MCHC 34.6 30.0 - 36.5 g/dL    RDW 13.0 11.5 - 14.5 %    Platelets 300 150 - 400 K/uL    MPV 9.5 8.9 - 12.9 FL    Nucleated RBCs 0.0 0.0  WBC    nRBC 0.00 0.00 - 0.01 K/uL   Comprehensive Metabolic Panel    Collection Time: 06/09/24 11:09 AM   Result Value Ref Range    Sodium 140 136 - 145 mmol/L    Potassium 3.7 3.5 - 5.1 mmol/L    Chloride 106 97 - 108 mmol/L    CO2 28 21 - 32 mmol/L    Anion Gap 6 5 - 15 mmol/L    Glucose 85 65 - 100 mg/dL    BUN 18 6 - 20 mg/dL    Creatinine 1.03 0.70 - 1.30 mg/dL    BUN/Creatinine Ratio 17 12 - 20      Est, Glom Filt Rate >90 >60 ml/min/1.73m2    Calcium 9.2 8.5 - 10.1 mg/dL    Total Bilirubin 0.3 0.2 - 1.0 mg/dL    AST 17 15 - 37 U/L    ALT 27 12 - 78 U/L    Alk Phosphatase 77 45 - 117 U/L    Total Protein 7.7 6.4 - 8.2 g/dL    Albumin 3.7 3.5 - 5.0 g/dL    Globulin 4.0 2.0 - 4.0 g/dL    Albumin/Globulin Ratio 0.9 (L) 1.1 - 2.2     Ethanol    Collection Time: 06/09/24 11:09 AM   Result  Value Ref Range    Ethanol Lvl 55 (H) <10 mg/dL       Radiologic Studies  CT HEAD WO CONTRAST   Final Result   No acute intracranial abnormality.            Electronically signed by Fady Santana MD      CT CERVICAL SPINE WO CONTRAST   Final Result      1.  No acute osseous abnormality.   2. Degenerative disc disease at C6-C7.          Electronically signed by Fady Santana MD      CT CHEST ABDOMEN PELVIS W CONTRAST Additional Contrast? None   Final Result      1. No posttraumatic abnormalities identified within the chest, abdomen, or   pelvis.   2. Diffuse fatty infiltration of the liver.   3. Clear lungs.      Electronically signed by Fady Santana MD      XR CHEST PORTABLE   Final Result      No acute process on portable chest.         Electronically signed by Fady Santana MD      XR SHOULDER RIGHT (MIN 2 VIEWS)   Final Result   No acute abnormality.      Electronically signed by Fady Santana MD      XR KNEE LEFT (3 VIEWS)   Final Result   No acute abnormality.      Electronically signed by Fady Santana MD        ------------------------------------------------------------------------------------------------------------  The evaluation and treatment you received in the Emergency Department were for an urgent problem. It is important that you follow-up with a doctor, nurse practitioner, or physician assistant to:  (1) confirm your diagnosis,  (2) re-evaluation of changes in your illness and treatment, and (3) for ongoing care. Please take your discharge instructions with you when you go to your follow-up appointment.     If you have any problem arranging a follow-up appointment, contact us!  If your symptoms become worse or you do not improve as expected, please return to us. We are available 24 hours a day.     If a prescription has been provided, please fill it as soon as possible to prevent a delay in treatment. If you have any questions or reservations about taking the medication due to side effects or interactions